# Patient Record
Sex: FEMALE | Race: BLACK OR AFRICAN AMERICAN | Employment: OTHER
[De-identification: names, ages, dates, MRNs, and addresses within clinical notes are randomized per-mention and may not be internally consistent; named-entity substitution may affect disease eponyms.]

---

## 2021-01-01 ENCOUNTER — HOME CARE VISIT (OUTPATIENT)
Dept: SCHEDULING | Facility: HOME HEALTH | Age: 86
End: 2021-01-01
Payer: MEDICARE

## 2021-01-01 ENCOUNTER — HOSPITAL ENCOUNTER (INPATIENT)
Age: 86
LOS: 9 days | Discharge: HOME HEALTH | End: 2021-09-29
Attending: INTERNAL MEDICINE | Admitting: INTERNAL MEDICINE

## 2021-01-01 ENCOUNTER — HOME CARE VISIT (OUTPATIENT)
Dept: HOSPICE | Facility: HOSPICE | Age: 86
End: 2021-01-01
Payer: MEDICARE

## 2021-01-01 ENCOUNTER — HOSPICE ADMISSION (OUTPATIENT)
Dept: HOSPICE | Facility: HOSPICE | Age: 86
End: 2021-01-01
Payer: MEDICARE

## 2021-01-01 ENCOUNTER — HOSPITAL ENCOUNTER (OUTPATIENT)
Dept: LAB | Age: 86
Discharge: HOME | End: 2021-09-29
Payer: MEDICARE

## 2021-01-01 VITALS
SYSTOLIC BLOOD PRESSURE: 150 MMHG | DIASTOLIC BLOOD PRESSURE: 110 MMHG | HEART RATE: 111 BPM | RESPIRATION RATE: 14 BRPM | TEMPERATURE: 98.3 F

## 2021-01-01 VITALS
RESPIRATION RATE: 12 BRPM | TEMPERATURE: 98.5 F | SYSTOLIC BLOOD PRESSURE: 138 MMHG | DIASTOLIC BLOOD PRESSURE: 76 MMHG | HEART RATE: 80 BPM

## 2021-01-01 VITALS
SYSTOLIC BLOOD PRESSURE: 118 MMHG | RESPIRATION RATE: 12 BRPM | TEMPERATURE: 97.2 F | HEART RATE: 66 BPM | DIASTOLIC BLOOD PRESSURE: 60 MMHG

## 2021-01-01 VITALS
TEMPERATURE: 98.3 F | DIASTOLIC BLOOD PRESSURE: 82 MMHG | HEART RATE: 92 BPM | SYSTOLIC BLOOD PRESSURE: 129 MMHG | RESPIRATION RATE: 12 BRPM

## 2021-01-01 VITALS — SYSTOLIC BLOOD PRESSURE: 90 MMHG | DIASTOLIC BLOOD PRESSURE: 70 MMHG | RESPIRATION RATE: 16 BRPM | HEART RATE: 76 BPM

## 2021-01-01 VITALS
HEART RATE: 78 BPM | RESPIRATION RATE: 12 BRPM | DIASTOLIC BLOOD PRESSURE: 50 MMHG | SYSTOLIC BLOOD PRESSURE: 82 MMHG | TEMPERATURE: 98.8 F

## 2021-01-01 VITALS
RESPIRATION RATE: 14 BRPM | TEMPERATURE: 96.2 F | SYSTOLIC BLOOD PRESSURE: 135 MMHG | HEART RATE: 88 BPM | DIASTOLIC BLOOD PRESSURE: 82 MMHG

## 2021-01-01 DIAGNOSIS — M54.50 CHRONIC LOW BACK PAIN WITHOUT SCIATICA, UNSPECIFIED BACK PAIN LATERALITY: ICD-10-CM

## 2021-01-01 DIAGNOSIS — M54.40 CHRONIC LOW BACK PAIN WITH SCIATICA, SCIATICA LATERALITY UNSPECIFIED, UNSPECIFIED BACK PAIN LATERALITY: ICD-10-CM

## 2021-01-01 DIAGNOSIS — E11.65 TYPE 2 DIABETES MELLITUS WITH HYPERGLYCEMIA, WITHOUT LONG-TERM CURRENT USE OF INSULIN (HCC): ICD-10-CM

## 2021-01-01 DIAGNOSIS — L89.152 PRESSURE INJURY OF SACRAL REGION, STAGE 2 (HCC): ICD-10-CM

## 2021-01-01 DIAGNOSIS — M54.50 ACUTE MIDLINE LOW BACK PAIN WITHOUT SCIATICA: ICD-10-CM

## 2021-01-01 DIAGNOSIS — U07.1 COVID-19 VIRUS INFECTION: ICD-10-CM

## 2021-01-01 DIAGNOSIS — U09.9 COVID-19 LONG HAULER: ICD-10-CM

## 2021-01-01 DIAGNOSIS — G93.41 METABOLIC ENCEPHALOPATHY: ICD-10-CM

## 2021-01-01 DIAGNOSIS — D64.9 ANEMIA, UNSPECIFIED TYPE: ICD-10-CM

## 2021-01-01 DIAGNOSIS — R13.12 OROPHARYNGEAL DYSPHAGIA: ICD-10-CM

## 2021-01-01 DIAGNOSIS — G89.29 CHRONIC LOW BACK PAIN WITHOUT SCIATICA, UNSPECIFIED BACK PAIN LATERALITY: ICD-10-CM

## 2021-01-01 DIAGNOSIS — M48.02 CERVICAL STENOSIS OF SPINAL CANAL: ICD-10-CM

## 2021-01-01 DIAGNOSIS — J18.9 PNEUMONIA OF BOTH LUNGS DUE TO INFECTIOUS ORGANISM, UNSPECIFIED PART OF LUNG: ICD-10-CM

## 2021-01-01 DIAGNOSIS — R62.7 FAILURE TO THRIVE IN ADULT: ICD-10-CM

## 2021-01-01 DIAGNOSIS — E87.1 HYPONATREMIA: ICD-10-CM

## 2021-01-01 DIAGNOSIS — E83.42 HYPOMAGNESEMIA: ICD-10-CM

## 2021-01-01 DIAGNOSIS — G89.29 CHRONIC LOW BACK PAIN WITH SCIATICA, SCIATICA LATERALITY UNSPECIFIED, UNSPECIFIED BACK PAIN LATERALITY: ICD-10-CM

## 2021-01-01 DIAGNOSIS — F41.9 ANXIETY: Primary | ICD-10-CM

## 2021-01-01 LAB
COVID-19 RAPID TEST, COVR: DETECTED
SOURCE, COVRS: ABNORMAL

## 2021-01-01 PROCEDURE — 74011250636 HC RX REV CODE- 250/636: Performed by: INTERNAL MEDICINE

## 2021-01-01 PROCEDURE — G0299 HHS/HOSPICE OF RN EA 15 MIN: HCPCS

## 2021-01-01 PROCEDURE — 74011000250 HC RX REV CODE- 250: Performed by: INTERNAL MEDICINE

## 2021-01-01 PROCEDURE — 0651 HSPC ROUTINE HOME CARE

## 2021-01-01 PROCEDURE — 0656 HSPC GENERAL INPATIENT

## 2021-01-01 PROCEDURE — HOSPICE MEDICATION HC HH HOSPICE MEDICATION

## 2021-01-01 PROCEDURE — 74011250637 HC RX REV CODE- 250/637: Performed by: NURSE PRACTITIONER

## 2021-01-01 PROCEDURE — 87635 SARS-COV-2 COVID-19 AMP PRB: CPT

## 2021-01-01 PROCEDURE — 74011250636 HC RX REV CODE- 250/636: Performed by: NURSE PRACTITIONER

## 2021-01-01 PROCEDURE — 74011250637 HC RX REV CODE- 250/637: Performed by: INTERNAL MEDICINE

## 2021-01-01 PROCEDURE — 3336500001 HSPC ELECTION

## 2021-01-01 PROCEDURE — 3331090004 HSPC SERVICE INTENSITY ADD-ON

## 2021-01-01 PROCEDURE — 99221 1ST HOSP IP/OBS SF/LOW 40: CPT | Performed by: INTERNAL MEDICINE

## 2021-01-01 RX ORDER — HALOPERIDOL 2 MG/ML
2 SOLUTION ORAL EVERY 6 HOURS
Status: DISCONTINUED | OUTPATIENT
Start: 2021-01-01 | End: 2021-01-01 | Stop reason: HOSPADM

## 2021-01-01 RX ORDER — SODIUM CHLORIDE 0.9 % (FLUSH) 0.9 %
3 SYRINGE (ML) INJECTION AS NEEDED
Status: DISCONTINUED | OUTPATIENT
Start: 2021-01-01 | End: 2021-01-01

## 2021-01-01 RX ORDER — HALOPERIDOL 5 MG/ML
2 INJECTION INTRAMUSCULAR
Status: DISCONTINUED | OUTPATIENT
Start: 2021-01-01 | End: 2021-01-01 | Stop reason: HOSPADM

## 2021-01-01 RX ORDER — LORAZEPAM 1 MG/1
1 TABLET ORAL
Status: DISCONTINUED | OUTPATIENT
Start: 2021-01-01 | End: 2021-01-01 | Stop reason: HOSPADM

## 2021-01-01 RX ORDER — HALOPERIDOL 5 MG/ML
2 INJECTION INTRAMUSCULAR
Status: DISCONTINUED | OUTPATIENT
Start: 2021-01-01 | End: 2021-01-01

## 2021-01-01 RX ORDER — TRAMADOL HYDROCHLORIDE 50 MG/1
TABLET ORAL
COMMUNITY
Start: 2021-01-01 | End: 2021-01-01

## 2021-01-01 RX ORDER — MORPHINE SULFATE 20 MG/ML
5 SOLUTION ORAL
Qty: 30 ML | Refills: 0 | Status: SHIPPED | OUTPATIENT
Start: 2021-01-01 | End: 2021-01-01

## 2021-01-01 RX ORDER — FACIAL-BODY WIPES
10 EACH TOPICAL DAILY PRN
COMMUNITY
Start: 2021-01-01 | End: 2021-01-01

## 2021-01-01 RX ORDER — SODIUM CHLORIDE 0.9 % (FLUSH) 0.9 %
3 SYRINGE (ML) INJECTION EVERY 12 HOURS
Status: DISCONTINUED | OUTPATIENT
Start: 2021-01-01 | End: 2021-01-01

## 2021-01-01 RX ORDER — HYDRALAZINE HYDROCHLORIDE 50 MG/1
50 TABLET, FILM COATED ORAL 3 TIMES DAILY
COMMUNITY
Start: 2021-01-01 | End: 2021-01-01

## 2021-01-01 RX ORDER — ACETAMINOPHEN 650 MG/1
650 SUPPOSITORY RECTAL
Qty: 2 SUPPOSITORY | Refills: 0 | Status: SHIPPED | OUTPATIENT
Start: 2021-01-01

## 2021-01-01 RX ORDER — DOXYLAMINE SUCCINATE 25 MG
TABLET ORAL AS NEEDED
Qty: 15 G | Refills: 0 | Status: SHIPPED | OUTPATIENT
Start: 2021-01-01

## 2021-01-01 RX ORDER — GLYCOPYRROLATE 0.2 MG/ML
0.2 INJECTION INTRAMUSCULAR; INTRAVENOUS
Status: DISCONTINUED | OUTPATIENT
Start: 2021-01-01 | End: 2021-01-01 | Stop reason: HOSPADM

## 2021-01-01 RX ORDER — DOXYLAMINE SUCCINATE 25 MG
TABLET ORAL
Status: DISCONTINUED | OUTPATIENT
Start: 2021-01-01 | End: 2021-01-01 | Stop reason: HOSPADM

## 2021-01-01 RX ORDER — HALOPERIDOL 2 MG/ML
2 SOLUTION ORAL
Status: DISCONTINUED | OUTPATIENT
Start: 2021-01-01 | End: 2021-01-01 | Stop reason: HOSPADM

## 2021-01-01 RX ORDER — METRONIDAZOLE 500 MG/1
500 TABLET ORAL
Qty: 10 TABLET | Refills: 0 | Status: SHIPPED | OUTPATIENT
Start: 2021-01-01 | End: 2021-01-01

## 2021-01-01 RX ORDER — MORPHINE SULFATE 100 MG/5ML
5 SOLUTION ORAL
Status: DISCONTINUED | OUTPATIENT
Start: 2021-01-01 | End: 2021-01-01 | Stop reason: HOSPADM

## 2021-01-01 RX ORDER — METRONIDAZOLE 500 MG/1
500 TABLET ORAL AS NEEDED
Qty: 5 TABLET | Refills: 0 | Status: SHIPPED | OUTPATIENT
Start: 2021-01-01

## 2021-01-01 RX ORDER — FENTANYL 25 UG/1
1 PATCH TRANSDERMAL
Status: DISCONTINUED | OUTPATIENT
Start: 2021-01-01 | End: 2021-01-01 | Stop reason: HOSPADM

## 2021-01-01 RX ORDER — FENTANYL 12.5 UG/1
1 PATCH TRANSDERMAL
Status: DISCONTINUED | OUTPATIENT
Start: 2021-01-01 | End: 2021-01-01

## 2021-01-01 RX ORDER — MORPHINE SULFATE 2 MG/ML
2 INJECTION, SOLUTION INTRAMUSCULAR; INTRAVENOUS
Status: DISCONTINUED | OUTPATIENT
Start: 2021-01-01 | End: 2021-01-01

## 2021-01-01 RX ORDER — MORPHINE SULFATE 2 MG/ML
2 INJECTION, SOLUTION INTRAMUSCULAR; INTRAVENOUS
Status: DISCONTINUED | OUTPATIENT
Start: 2021-01-01 | End: 2021-01-01 | Stop reason: HOSPADM

## 2021-01-01 RX ORDER — LORAZEPAM 1 MG/1
1 TABLET ORAL
Status: DISCONTINUED | OUTPATIENT
Start: 2021-01-01 | End: 2021-01-01

## 2021-01-01 RX ORDER — LORAZEPAM 1 MG/1
1 TABLET ORAL
Qty: 20 TABLET | Refills: 0 | Status: SHIPPED | OUTPATIENT
Start: 2021-01-01

## 2021-01-01 RX ORDER — METRONIDAZOLE 250 MG/1
500 TABLET ORAL
Status: DISCONTINUED | OUTPATIENT
Start: 2021-01-01 | End: 2021-01-01 | Stop reason: HOSPADM

## 2021-01-01 RX ORDER — HYOSCYAMINE SULFATE 0.12 MG/1
0.12 TABLET SUBLINGUAL
Qty: 10 TABLET | Refills: 0 | Status: SHIPPED | OUTPATIENT
Start: 2021-01-01

## 2021-01-01 RX ORDER — DOXYLAMINE SUCCINATE 25 MG
1 TABLET ORAL
Qty: 15 G | Refills: 0 | Status: SHIPPED | OUTPATIENT
Start: 2021-01-01 | End: 2021-01-01

## 2021-01-01 RX ORDER — BENZONATATE 100 MG/1
100 CAPSULE ORAL
COMMUNITY
Start: 2021-01-01 | End: 2021-01-01

## 2021-01-01 RX ORDER — DOXYLAMINE SUCCINATE 25 MG
TABLET ORAL AS NEEDED
Status: DISCONTINUED | OUTPATIENT
Start: 2021-01-01 | End: 2021-01-01 | Stop reason: HOSPADM

## 2021-01-01 RX ORDER — HALOPERIDOL 5 MG/ML
1 INJECTION INTRAMUSCULAR EVERY 6 HOURS
Status: DISCONTINUED | OUTPATIENT
Start: 2021-01-01 | End: 2021-01-01

## 2021-01-01 RX ORDER — AMLODIPINE BESYLATE 10 MG/1
10 TABLET ORAL DAILY
COMMUNITY
Start: 2021-01-01 | End: 2021-01-01

## 2021-01-01 RX ORDER — CARVEDILOL 25 MG/1
25 TABLET ORAL 2 TIMES DAILY
COMMUNITY
Start: 2021-01-01 | End: 2021-01-01

## 2021-01-01 RX ORDER — FACIAL-BODY WIPES
10 EACH TOPICAL AS NEEDED
Status: DISCONTINUED | OUTPATIENT
Start: 2021-01-01 | End: 2021-01-01 | Stop reason: HOSPADM

## 2021-01-01 RX ORDER — ACETAMINOPHEN 500 MG
1-2 TABLET ORAL
COMMUNITY
End: 2021-01-01

## 2021-01-01 RX ORDER — METRONIDAZOLE 250 MG/1
500 TABLET ORAL AS NEEDED
Status: DISCONTINUED | OUTPATIENT
Start: 2021-01-01 | End: 2021-01-01 | Stop reason: HOSPADM

## 2021-01-01 RX ORDER — FENTANYL 25 UG/1
1 PATCH TRANSDERMAL
Qty: 5 PATCH | Refills: 0 | Status: SHIPPED | OUTPATIENT
Start: 2021-01-01 | End: 2021-01-01

## 2021-01-01 RX ORDER — DEXAMETHASONE 6 MG/1
6 TABLET ORAL DAILY
COMMUNITY
Start: 2021-01-01 | End: 2021-01-01

## 2021-01-01 RX ORDER — FACIAL-BODY WIPES
10 EACH TOPICAL
Qty: 2 SUPPOSITORY | Refills: 0 | Status: SHIPPED | OUTPATIENT
Start: 2021-01-01 | End: 2021-01-01

## 2021-01-01 RX ORDER — ACETAMINOPHEN 650 MG/1
650 SUPPOSITORY RECTAL
Status: DISCONTINUED | OUTPATIENT
Start: 2021-01-01 | End: 2021-01-01 | Stop reason: HOSPADM

## 2021-01-01 RX ORDER — HALOPERIDOL 2 MG/ML
2 SOLUTION ORAL EVERY 6 HOURS
Qty: 30 ML | Refills: 0 | Status: SHIPPED | OUTPATIENT
Start: 2021-01-01 | End: 2021-01-01

## 2021-01-01 RX ORDER — GLYCOPYRROLATE 0.2 MG/ML
0.2 INJECTION INTRAMUSCULAR; INTRAVENOUS
Status: DISCONTINUED | OUTPATIENT
Start: 2021-01-01 | End: 2021-01-01

## 2021-01-01 RX ORDER — FLUTICASONE PROPIONATE 50 MCG
1 SPRAY, SUSPENSION (ML) NASAL DAILY
COMMUNITY
Start: 2021-01-01 | End: 2021-01-01

## 2021-01-01 RX ORDER — GABAPENTIN 300 MG/1
300 CAPSULE ORAL
COMMUNITY
End: 2021-01-01

## 2021-01-01 RX ORDER — HYOSCYAMINE SULFATE 0.12 MG/1
0.12 TABLET SUBLINGUAL
Status: DISCONTINUED | OUTPATIENT
Start: 2021-01-01 | End: 2021-01-01 | Stop reason: HOSPADM

## 2021-01-01 RX ORDER — HALOPERIDOL 2 MG/ML
2 SOLUTION ORAL
Qty: 30 ML | Refills: 0 | Status: SHIPPED | OUTPATIENT
Start: 2021-01-01

## 2021-01-01 RX ORDER — FAMOTIDINE 20 MG/1
1 TABLET, FILM COATED ORAL DAILY
COMMUNITY
Start: 2021-01-01 | End: 2021-01-01

## 2021-01-01 RX ADMIN — MORPHINE SULFATE 5 MG: 10 SOLUTION ORAL at 09:52

## 2021-01-01 RX ADMIN — MORPHINE SULFATE 2 MG: 2 INJECTION, SOLUTION INTRAMUSCULAR; INTRAVENOUS at 20:45

## 2021-01-01 RX ADMIN — HALOPERIDOL LACTATE 2 MG: 5 INJECTION, SOLUTION INTRAMUSCULAR at 05:06

## 2021-01-01 RX ADMIN — MORPHINE SULFATE 2 MG: 2 INJECTION, SOLUTION INTRAMUSCULAR; INTRAVENOUS at 22:07

## 2021-01-01 RX ADMIN — MORPHINE SULFATE 2 MG: 2 INJECTION, SOLUTION INTRAMUSCULAR; INTRAVENOUS at 13:07

## 2021-01-01 RX ADMIN — HALOPERIDOL LACTATE 2 MG: 5 INJECTION, SOLUTION INTRAMUSCULAR at 13:08

## 2021-01-01 RX ADMIN — MORPHINE SULFATE 2 MG: 2 INJECTION, SOLUTION INTRAMUSCULAR; INTRAVENOUS at 08:23

## 2021-01-01 RX ADMIN — HALOPERIDOL LACTATE 1 MG: 5 INJECTION, SOLUTION INTRAMUSCULAR at 12:52

## 2021-01-01 RX ADMIN — LORAZEPAM 1 MG: 1 TABLET ORAL at 07:23

## 2021-01-01 RX ADMIN — LORAZEPAM 1 MG: 1 TABLET ORAL at 09:10

## 2021-01-01 RX ADMIN — HALOPERIDOL LACTATE 1 MG: 5 INJECTION, SOLUTION INTRAMUSCULAR at 05:55

## 2021-01-01 RX ADMIN — Medication 3 ML: at 21:00

## 2021-01-01 RX ADMIN — HALOPERIDOL LACTATE 2 MG: 5 INJECTION, SOLUTION INTRAMUSCULAR at 13:03

## 2021-01-01 RX ADMIN — MORPHINE SULFATE 2 MG: 2 INJECTION, SOLUTION INTRAMUSCULAR; INTRAVENOUS at 15:05

## 2021-01-01 RX ADMIN — Medication 3 ML: at 01:25

## 2021-01-01 RX ADMIN — MORPHINE SULFATE 2 MG: 2 INJECTION, SOLUTION INTRAMUSCULAR; INTRAVENOUS at 09:33

## 2021-01-01 RX ADMIN — MORPHINE SULFATE 2 MG: 2 INJECTION, SOLUTION INTRAMUSCULAR; INTRAVENOUS at 10:47

## 2021-01-01 RX ADMIN — MORPHINE SULFATE 2 MG: 2 INJECTION, SOLUTION INTRAMUSCULAR; INTRAVENOUS at 13:03

## 2021-01-01 RX ADMIN — MORPHINE SULFATE 2 MG: 2 INJECTION, SOLUTION INTRAMUSCULAR; INTRAVENOUS at 16:25

## 2021-01-01 RX ADMIN — SODIUM CHLORIDE, PRESERVATIVE FREE 3 ML: 5 INJECTION INTRAVENOUS at 13:04

## 2021-01-01 RX ADMIN — HALOPERIDOL LACTATE 1 MG: 5 INJECTION, SOLUTION INTRAMUSCULAR at 17:27

## 2021-01-01 RX ADMIN — LORAZEPAM 1 MG: 1 TABLET ORAL at 09:32

## 2021-01-01 RX ADMIN — HALOPERIDOL LACTATE 1 MG: 5 INJECTION, SOLUTION INTRAMUSCULAR at 05:42

## 2021-01-01 RX ADMIN — MORPHINE SULFATE 2 MG: 2 INJECTION, SOLUTION INTRAMUSCULAR; INTRAVENOUS at 17:27

## 2021-01-01 RX ADMIN — METRONIDAZOLE 500 MG: 250 TABLET ORAL at 10:00

## 2021-01-01 RX ADMIN — HALOPERIDOL 2 MG: 2 SOLUTION ORAL at 12:44

## 2021-01-01 RX ADMIN — HALOPERIDOL LACTATE 1 MG: 5 INJECTION, SOLUTION INTRAMUSCULAR at 00:26

## 2021-01-01 RX ADMIN — MORPHINE SULFATE 2 MG: 2 INJECTION, SOLUTION INTRAMUSCULAR; INTRAVENOUS at 12:36

## 2021-01-01 RX ADMIN — HALOPERIDOL LACTATE 2 MG: 5 INJECTION, SOLUTION INTRAMUSCULAR at 16:25

## 2021-01-01 RX ADMIN — MORPHINE SULFATE 5 MG: 10 SOLUTION ORAL at 15:14

## 2021-01-01 RX ADMIN — MORPHINE SULFATE 2 MG: 2 INJECTION, SOLUTION INTRAMUSCULAR; INTRAVENOUS at 15:53

## 2021-01-01 RX ADMIN — HALOPERIDOL LACTATE 1 MG: 5 INJECTION, SOLUTION INTRAMUSCULAR at 18:17

## 2021-01-01 RX ADMIN — Medication: at 10:01

## 2021-01-01 RX ADMIN — HALOPERIDOL LACTATE 1 MG: 5 INJECTION, SOLUTION INTRAMUSCULAR at 00:22

## 2021-01-01 RX ADMIN — Medication 3 ML: at 10:47

## 2021-01-01 RX ADMIN — MORPHINE SULFATE 2 MG: 2 INJECTION, SOLUTION INTRAMUSCULAR; INTRAVENOUS at 22:34

## 2021-01-01 RX ADMIN — MORPHINE SULFATE 2 MG: 2 INJECTION, SOLUTION INTRAMUSCULAR; INTRAVENOUS at 05:05

## 2021-01-01 RX ADMIN — HALOPERIDOL LACTATE 1 MG: 5 INJECTION, SOLUTION INTRAMUSCULAR at 17:30

## 2021-01-01 RX ADMIN — MORPHINE SULFATE 2 MG: 2 INJECTION, SOLUTION INTRAMUSCULAR; INTRAVENOUS at 07:22

## 2021-01-01 RX ADMIN — HALOPERIDOL 2 MG: 2 SOLUTION ORAL at 18:30

## 2021-01-01 RX ADMIN — MORPHINE SULFATE 2 MG: 2 INJECTION, SOLUTION INTRAMUSCULAR; INTRAVENOUS at 03:07

## 2021-01-01 RX ADMIN — HALOPERIDOL 2 MG: 2 SOLUTION ORAL at 23:28

## 2021-01-01 RX ADMIN — HALOPERIDOL 2 MG: 2 SOLUTION ORAL at 05:25

## 2021-01-01 RX ADMIN — HALOPERIDOL LACTATE 2 MG: 5 INJECTION, SOLUTION INTRAMUSCULAR at 05:51

## 2021-01-01 RX ADMIN — MORPHINE SULFATE 2 MG: 2 INJECTION, SOLUTION INTRAMUSCULAR; INTRAVENOUS at 10:35

## 2021-01-01 RX ADMIN — SODIUM CHLORIDE, PRESERVATIVE FREE 3 ML: 5 INJECTION INTRAVENOUS at 22:34

## 2021-01-01 RX ADMIN — MORPHINE SULFATE 2 MG: 2 INJECTION, SOLUTION INTRAMUSCULAR; INTRAVENOUS at 05:50

## 2021-01-01 RX ADMIN — HALOPERIDOL LACTATE 1 MG: 5 INJECTION, SOLUTION INTRAMUSCULAR at 13:05

## 2021-01-01 RX ADMIN — MORPHINE SULFATE 2 MG: 2 INJECTION, SOLUTION INTRAMUSCULAR; INTRAVENOUS at 05:43

## 2021-01-01 RX ADMIN — HALOPERIDOL LACTATE 1 MG: 5 INJECTION, SOLUTION INTRAMUSCULAR at 05:43

## 2021-01-01 RX ADMIN — HALOPERIDOL LACTATE 2 MG: 5 INJECTION, SOLUTION INTRAMUSCULAR at 22:05

## 2021-01-01 RX ADMIN — HALOPERIDOL 2 MG: 2 SOLUTION ORAL at 13:15

## 2021-01-01 RX ADMIN — HALOPERIDOL LACTATE 2 MG: 5 INJECTION, SOLUTION INTRAMUSCULAR at 15:06

## 2021-01-01 RX ADMIN — HALOPERIDOL LACTATE 1 MG: 5 INJECTION, SOLUTION INTRAMUSCULAR at 00:01

## 2021-01-01 RX ADMIN — HALOPERIDOL 2 MG: 2 SOLUTION ORAL at 10:32

## 2021-01-01 RX ADMIN — HALOPERIDOL LACTATE 2 MG: 5 INJECTION, SOLUTION INTRAMUSCULAR at 08:24

## 2021-09-20 PROBLEM — U09.9 COVID-19 LONG HAULER: Status: ACTIVE | Noted: 2021-01-01

## 2021-09-20 PROBLEM — L89.152 PRESSURE INJURY OF SACRAL REGION, STAGE 2 (HCC): Status: ACTIVE | Noted: 2021-01-01

## 2021-09-20 PROBLEM — U07.1 COVID-19 VIRUS INFECTION: Status: ACTIVE | Noted: 2021-01-01

## 2021-09-20 PROBLEM — E11.65 TYPE 2 DIABETES MELLITUS WITH HYPERGLYCEMIA, WITHOUT LONG-TERM CURRENT USE OF INSULIN (HCC): Status: ACTIVE | Noted: 2017-08-18

## 2021-09-20 PROBLEM — R13.12 OROPHARYNGEAL DYSPHAGIA: Status: ACTIVE | Noted: 2017-08-18

## 2021-09-20 PROBLEM — D64.9 ANEMIA: Status: ACTIVE | Noted: 2017-08-18

## 2021-09-20 PROBLEM — N30.01 ACUTE CYSTITIS WITH HEMATURIA: Status: RESOLVED | Noted: 2021-01-01 | Resolved: 2021-01-01

## 2021-09-20 PROBLEM — J18.9 PNEUMONIA OF BOTH LUNGS DUE TO INFECTIOUS ORGANISM: Status: ACTIVE | Noted: 2021-01-01

## 2021-09-20 PROBLEM — R53.81 DEBILITY: Status: ACTIVE | Noted: 2019-03-28

## 2021-09-20 PROBLEM — M48.02 CERVICAL STENOSIS OF SPINAL CANAL: Status: ACTIVE | Noted: 2017-08-18

## 2021-09-20 PROBLEM — E87.1 HYPONATREMIA: Status: ACTIVE | Noted: 2021-01-01

## 2021-09-20 PROBLEM — L89.153 PRESSURE INJURY OF SACRAL REGION, STAGE 3 (HCC): Status: ACTIVE | Noted: 2021-01-01

## 2021-09-20 PROBLEM — R62.7 FAILURE TO THRIVE IN ADULT: Status: ACTIVE | Noted: 2021-01-01

## 2021-09-20 PROBLEM — N30.01 ACUTE CYSTITIS WITH HEMATURIA: Status: ACTIVE | Noted: 2021-01-01

## 2021-09-20 PROBLEM — E83.42 HYPOMAGNESEMIA: Status: ACTIVE | Noted: 2021-01-01

## 2021-09-20 PROBLEM — I10 ESSENTIAL HYPERTENSION: Status: RESOLVED | Noted: 2017-08-18 | Resolved: 2021-01-01

## 2021-09-20 PROBLEM — M54.50 ACUTE MIDLINE LOW BACK PAIN WITHOUT SCIATICA: Status: ACTIVE | Noted: 2021-01-01

## 2021-09-20 PROBLEM — I10 ESSENTIAL HYPERTENSION: Status: ACTIVE | Noted: 2017-08-18

## 2021-09-20 PROBLEM — G93.41 METABOLIC ENCEPHALOPATHY: Status: ACTIVE | Noted: 2021-01-01

## 2021-09-20 NOTE — PROGRESS NOTES
Problem: Risk for Spread of Infection  Goal: Prevent transmission of infectious organism to others  Description: Prevent the transmission of infectious organisms to other patients, staff members, and visitors. Outcome: Progressing Towards Goal  Note: Pt is Covid + 9/19;  PPE provided for staff and family     Problem: Falls - Risk of  Goal: *Absence of Falls  Description: Document [place] Fall Risk and appropriate interventions in the flowsheet. Outcome: Progressing Towards Goal  Note: Fall Risk Interventions:       Mentation Interventions: Adequate sleep, hydration, pain control, Bed/chair exit alarm (door closed , Droplet Precaution)    Medication Interventions: Bed/chair exit alarm    Elimination Interventions: Bed/chair exit alarm, Call light in reach, Toileting schedule/hourly rounds              Problem: Pressure Injury - Risk of  Goal: *Prevention of pressure injury  Description: Document Moustapha Scale and appropriate interventions in the flowsheet.   Outcome: Progressing Towards Goal  Note: Pressure Injury Interventions:  Sensory Interventions: Assess changes in LOC, Avoid rigorous massage over bony prominences, Float heels, Keep linens dry and wrinkle-free    Moisture Interventions: Absorbent underpads, Apply protective barrier, creams and emollients, Internal/External urinary devices    Activity Interventions: Pressure redistribution bed/mattress(bed type)    Mobility Interventions: Float heels, HOB 30 degrees or less, Pressure redistribution bed/mattress (bed type)    Nutrition Interventions: Document food/fluid/supplement intake, Offer support with meals,snacks and hydration    Friction and Shear Interventions: Apply protective barrier, creams and emollients, Feet elevated on foot rest, Foam dressings/transparent film/skin sealants, HOB 30 degrees or less, Minimize layers                Problem: Pressure Injury - Risk of  Goal: *Prevention of pressure injury  Description: Document Moustapha Scale and appropriate interventions in the flowsheet. Outcome: Progressing Towards Goal  Note: Pressure Injury Interventions:  Sensory Interventions: Assess changes in LOC, Avoid rigorous massage over bony prominences, Float heels, Keep linens dry and wrinkle-free    Moisture Interventions: Absorbent underpads, Apply protective barrier, creams and emollients, Internal/External urinary devices    Activity Interventions: Pressure redistribution bed/mattress(bed type)    Mobility Interventions: Float heels, HOB 30 degrees or less, Pressure redistribution bed/mattress (bed type)    Nutrition Interventions: Document food/fluid/supplement intake, Offer support with meals,snacks and hydration    Friction and Shear Interventions: Apply protective barrier, creams and emollients, Feet elevated on foot rest, Foam dressings/transparent film/skin sealants, HOB 30 degrees or less, Minimize layers                Problem: Patient Education: Go to Patient Education Activity  Goal: Patient/Family Education  Outcome: Progressing Towards Goal  Note: Pt nonverbal, end of Life in <6 months per Dr note     Problem: Dyspnea Due to End of Life  Goal: Demonstrate understanding of and ability to manage respiratory symptoms at end of life  Outcome: Progressing Towards Goal  Note: RA O2 sat 97%,.  No SOB noted on arrival. PRN O2 for comfort per Dr order     Problem: Communication Deficit  Goal: Effectively communicate symptoms, needs, and concerns  Outcome: Progressing Towards Goal  Note: Pt is nonverbal     Problem: Imminent Death  Goal: Collaborate with patient/family/caregiver/interdisciplinary team to minimize and manage end of life symptoms  Outcome: Progressing Towards Goal  Note: No family or care giver present when pt arrived [place] [facility]

## 2021-09-20 NOTE — H&P
[place]: Mrs. Nelson Whitt is a 51-year-old woman who has shown a deepening metabolic encephalopathy over the past 6 weeks. Freida Fleischer has now reached a point of near obtundation and can no longer swallow food fluid or medications. Freida Fleischer is intermittently agitated. Freida Fleischer has acute on chronic pain from cervical spinal stenosis, radicular neuropathic pain, and stage III sacral decubitus ulcers which is undoubtedly contributing to her delirium/metabolic encephalopathy. .    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 Past medical history: Hypertensive cardiomyopathy are diagnoses unrelated to her metabolic encephalopathy/delirium that nevertheless adversely affect prognosis to some degree,    Social history: Non-smoker  lives with daughter who is primary care provider. Family is consistent and expressing their inability to continue to provide care at home. Family history: Noncontributory    Review of systems: Unobtainable from profoundly obtunded patient. Physical exam: See nurses notes for vital signs on admission demonstrating marked hypotension (66/38) bradycardia dysrhythmia (52 bpm). There is no evidence of respiratory distress with respiratory rate of 15 breaths/min. She is an -American woman not showing any arina livedo reticularis. There are coarse interstitial breath sounds throughout anterior and posterior lung fields bilaterally. Abdomen is scaphoid. Bowel sounds are reduced. Extremities show sarcopenia.   The patient's sacral wound is fairly shallow due to her lack of adipose there is a heavy purulent slough. Impression and plan: The patient's cognitive and physical slide with persistence Covid-19 infection starting August 5, 2021 has caused her caregiving daughter, Joyce Perera, to elect  on her mother's behalf to stop life extending interventions including artificial hydration and nutrition.   Lisandra Conrad the circumstances this woman's life expectancy is less than 1 week. During that period of terminal decline she will continue to require parenteral medication for pain, dyspnea and emotional/psychiatric distress. Pressure relief interventions and hygienic infrequent change of sacral dressing are appropriate. We will avoid scheduled opioid analgesia in light of the patient's extreme hypotension. We will provide cautious doses of Morphine 2 mg intravenously every 30 minutes when needed for evident distress. Likewise Haldol intravenous will be provided 2 mg hourly as necessary for acute delirium.

## 2021-09-20 NOTE — [facility] IDG NURSE NOTES
Patient: Sadaf Moreira    Date: 09/20/21  Time: 6:39 PM    [facility] Nurse Notes  70-year-old female arrived to [facility] via [facility] with Dx; Metabolic Encephalopathy. Pt is COVID (+)  from test done 8/4 and 9/19. PPE and Droplet Precautions in place. Pt placed on Air Mattress due to immobility and sacral wound. VSS; Temp 98.6   Patient's LOC is GIP, with Symptom management for pain. 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 Pt unable to move on her own and is Total Care. Pt has stage 3 sacral wound, foul smelling with brownish - green drainage and unattached edges with visible bone, measuring 7cm x5cm x 3 cm. Linn placed shortly after arrival, thick yellow urine returned with sediment, pus and strong foul odor . Pt has  #20 peripheral functioning IV in right hand. Medical History includes, Cervical Spine Stenosis, HTN, CHF, Neuropathic Pain, Dm and Glaucoma. Granddaughter Ondina Tucker" is at the bedside, stating \" was up and about, sharp mentally before she got Covid in August then went downhill\". Granddaughter also stating pt lives with her daughter , who is her primary care giver. She didn't have that sore (stage 3 ) until she went into the hospital in august\"    Admission complete and initial General Hospice care plans initiated in addition to symptom-management and LDA. IDG team made aware of plan of care and immediate needs. No active signs or symptoms of pain, shortness of breath , anxiety , seizures or nausea/vomiting upon arrival and presently. Comfort and safety measures in place.            Signed by: Maine Ruiz

## 2021-09-20 NOTE — PROGRESS NOTES
1500: Pt arrived via [facility] with Dx; Metabolic Encephalopathy. Pts LOC is GIP, sx's management for pain. 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 Dr Alden Morse in to see pt. Orders received for morton placement and scaral wound dressing care to be done Q72 bours. Pt has functioning IV, #20 in right hand. Pt name and  identified. Pt in bed, resting with eyes closed. No signs or symptoms of pain, shortness of breath, anxiety , seizures or nausea/vomiting. FLACC 0/10. Comfort and safety measures in place. HOB elevated 30 degrees. Side rails up x2. Bed low and locked. Bed alarm tab in place. Call light in reach of patient, Door closed, pt on Droplet Precautions for Covid. Reported pt had 2nd Positive test . [facility] and [facility] done with CNA. Pt expected to pass under GIP however will continue to assess change in LOC, medication & comfort needs, while collaborating with the Interdisciplinary Team.    : Morton catheter inserted without difficulty. Thick cloudy sediment yellow urine returned. Pt tolerated well.     1600: DRESSING CHANGE:  Pt has stage 3 to sacrum. Measures 7x5x3 with  Attached edges. Strong foul odor coming from wound when pre-existing dressing removed and area measured. Minimal drainage noted with blackened-greenish  areas in fleshy wound bed. Dr Alden Morse made aware of wound. Verbal order received. Wound cleaned with wound cleanser, packed with Calcium Algenate , covered with ABD pad then Opsite. Dressing tined and dated. Pt tolerated well. No wincing or grimacing of pain. 1830:  Pt's granddaughter \"Sarah \" here to see pt, PPE in place. Attempting to feed pt applesauce. Patient quietly in bed with eyes half-open. Respirations unlabored with no signs or symptoms of distress, pain, agitation, or discomfort noted. FLACC 0.     Report given to Alycia Baig RN

## 2021-09-21 NOTE — PROGRESS NOTES
Bedside report received from off-going RN visual identification made, assumed care of pt. Pt resting quietly with eyes closed, no agitation or restlessness, no grimacing or groaning. Pt respirations unlabored. Tab alert in place, rails up x 2, bed in lowest position, safety maintained. FLACC 0. Pt is at GIP level of care, no change to pt discharge plan. Pt to stay at [facility] until passing. Plan of care reviewed with CNA. 2114-Pt resting comfortably in bed with eyes closed; no signs of pain or distress noted. RR non labored. No agitation, NVD, or SOB. FLACC 0/10.    2248-Pt resting comfortably in bed with eyes closed; no signs of pain or distress noted. RR non labored. No agitation, NVD, or SOB. FLACC 0/10.    0125- Pt resting comfortably in bed with eyes closed; no signs of pain or distress noted. RR non labored. No agitation, NVD, or SOB. FLACC 0/10.    0521-Pt resting comfortably in bed with eyes closed; no signs of pain or distress noted. RR non labored. No agitation, NVD, or SOB. FLACC 0/10.

## 2021-09-21 NOTE — PROGRESS NOTES
SW attempted to call pt's daughter Ashwini Daniels to complete assessment. No one answered the phone and the mailbox was full. SW will continue to try to reach [facility] to complete assessment, assess for needs, and provide emotional support.

## 2021-09-21 NOTE — [facility] IDG SOCIAL WORKER NOTES
Patient: Barbie Azevedo    Date: 09/21/21  Time: 9:45 AM    [facility]  Notes  SW has read the initial comprehensive assessment and plan of care. No immediate needs noted. Initial SW assessment visit will be completed within 5 days of admission.          Signed by: Stephanie Yi LMSW

## 2021-09-21 NOTE — PROGRESS NOTES
0645: Pt admitted 9/20/2021 for [facility] level of care with a hospice diagnosis of metabolic encephalopathy as well as testing positive for COVID 19 on 8/5 and 9/19. Pt is on droplet plus precautions. She is being managed for pain and complex wound care. Pt did not require any PRN medications for symptom managementt. She is not on any scheduled medications. Presently pt is resting with eyes closed, resps are non-labored. No s/sx of pain or other discomfort observed. FLACC score 0/10.      0845: Daughter arrived and assisted to apply PPE. Pt resting with eyes closed, resps remain non-labored. FLACC score 0/10.     1045: No family present ion the room at this time. FLACC score 0/10. However dressing to sacral area is saturated and needs to be changed. Pt premedicated with Morphine 2mg IVP to promote comfort during dressing change and repositioning. Dressing changed and pt positioned on left side for comfort. Pt moaning during care, able to state she was hurting so pillows added for more comfort and pt stated some relief. Remains on an air mattress for comfort. 1115: More family arrived to visit. Assisted with PPE. Pt resting with eyes closed, resps regular and even. No s/sx of pain or other discomfort observed. FLACC score now 0/10. Family asking if they could remove shields once in the room. Advised by RN against this. 1320: No family present in the room. Pt remains in bed with eyes closed, resps regular and non-labored. FLACC score 0/10.     1520: Pt continues to rest with eyes closed, resps regular and non-labored. FLACC score 0/10.     1730: Pt resting with eyes closed, reps shallow but non-labored. FLACC score 0/10. Drsg to sacrum checked and remains dry and intact. Pt opens eyes to blankets being moved but s/sx of pain or other discomfort observed. 1850: Report given to oncoming RN.

## 2021-09-22 NOTE — PROGRESS NOTES
Demographics      Information provided by: Pt's daughter, Argenis Dumont.         Name: [facility]  GIP [x] Routine  [] Respite   []           From the in home program Yes [] No [x]                                                        Diagnosis: Metabolic Encephalopathy         Insurance    Medicare [x]   Medicaid  []  Ilir Hendominic  []      Other []                Social    []   Single []     []    [x]     Children: Daughter, Argenis Dumont and son, Kiera Causey in the Home prior to admission: Yes []  No [x]    [employer] Needed? Yes []  No [x]    If yes, explain:         Financial Concerns: There are no reported financial concerns at this time. SW will continue to assess for this need. [place] Application Needed   Yes []  No [x]     Medicaid application needed Yes []  No [x]                                    IFA Form Complete  Yes [x]   No []     Discharge Plans: Plans are to remain [facility] for GIP and comfort care. Work History :  Retired [x] [employer] [] Part-time [] Disabled []     [address]   Yes []  No [x]  [employer] []   [employer] [] [employer] [] [facility] []  [employer] []  [employer] []  Linked to [place]   Yes []  No []  Referral made to [employer] Yes [] No [x]         Advanced Directives Scanned in the system      Living Will  Yes  []  No [x]   HCPOA     Yes  []  No [x]   DPOA        Yes  []  No [x]  DNR           Yes [x]  No []         Spiritual / Sonu Juarez Support: Ubaldo VANN reports that there is \"not really\" a [place]ze supporting the family. Final Arrangements: Ubaldo VANN reports that she has not thought about a  home. AUBREE encouraged Ubaldo VANN to think about this and if  assistance is needed, SW will help with this process.  Ubaldo VANN thanked  for the support and reported that she will call  if there is a need. SW will follow up on this before the end of the week. Medical History and/or Narrative copied from the patients chart from the [address] [place] [address] Physician or Rn:  [facility] Nurse Notes-  80-year-old female arrived [facility] [place] [address] with Dx; Metabolic Encephalopathy. Pt is COVID (+)  from test done 8/4 and 9/19. PPE and Droplet Precautions in place. Pt placed on Air Mattress due to immobility and sacral wound. VSS; Temp 98.6   Patient's LOC is GIP, with Symptom management for pain. 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 Pt unable to move on her own and is Total Care. Pt has stage 3 sacral wound, foul smelling with brownish - green drainage and unattached edges with visible bone, measuring 7cm x5cm x 3 cm. Linn placed shortly after arrival, thick yellow urine returned with sediment, pus and strong foul odor . Pt has  #20 peripheral functioning IV in right hand. Medical History includes, Cervical Spine Stenosis, HTN, CHF, Neuropathic Pain, Dm and Glaucoma. Granddaughter Sarita Rodarte" is at the bedside, stating \" was up and about, sharp mentally before she got Covid in August then went downhill\". Granddaughter also stating pt lives with her daughter , who is her primary care giver. She didn't have that sore (stage 3 ) until she went into the hospital in august\"  Admission complete and initial General Hospice care plans initiated in addition to symptom-management and LDA. IDG team made aware of plan of care and immediate needs. No active signs or symptoms of pain, shortness of breath , anxiety , seizures or nausea/vomiting upon arrival and presently. Comfort and safety measures in place. Mental Health History: There is no reported mental health history. Volunteer discussion: Yes []    No [x]  **Due to COVID-19 protocols     Goals of care for the patient and family: To keep pt comfortable and to meet pt and family needs.      Coping and Bereavement: Mariama Deluca reports that she is \"doing okay\". This assessment was done over the phone and it did not sound as though Nohemi VANN was crying. SW will continue to assess for coping and bereavement needs. Was a Referral made to Bereavement  Yes [] No  [x]    Support Needs: José Miguel VANN reports that pt and the family are supportive of one another and there is plenty of support. SW explained SWs role and ways SW can be of support to the family. Radha Daly was thankful for the call. SW will continue to provide emotional support, weekly check-ins, and assess for needs.

## 2021-09-22 NOTE — PROGRESS NOTES
SW met with pt's son Adriel Bal and her grandson Lindsay Delcid as they were leaving pt's room after having visited with her. Lindsay Delcid asked SW about prognosis for pt and status. Sam Porras and Shaisam Rayshawnjohn know that they must have the security code and they reported that they will ask their family for the code. SW will ask [address] nurse to call Lindsay Delcid to give him an update on pt status. Lindsay Delcid reported that he can be notified any time of the day and night with an update on pt status. Adriel Bal and Lindsay Delcid reported that Lindsay Delcid will provide any updates to Adriel Bal. SW encouraged Lindsay Delcid and Adriel Bal to reach out to SW with any needs.

## 2021-09-22 NOTE — PROGRESS NOTES
Progress Note    Patient: Fabiola Caruso MRN: [id number]  SSN: [id number]    YOB: 1925  Age: 80 y.o. Sex: female      Admit Date: 9/20/2021    LOS: 2 days     Subjective:     Taking in bites and sips. Has required morphine x 4 doses for pain in the past 24 hours. Review of Systems:  Review of systems not obtained due to patient factors. Objective:     Vitals:    09/20/21 1510 09/21/21 0612 09/21/21 1100 09/22/21 0636   BP: (!) 66/38 130/75 121/72 121/64   Pulse: (!) 52 (!) 101 92    Resp: 15 18 17    Temp: 98.7 °F (37.1 °C)   (!) 96.2 °F (35.7 °C)        Intake and Output:  Current Shift: No intake/output data recorded. Last three shifts: 09/20 1901 - 09/22 0700  In: -   Out: 400 [Urine:400]    Physical Exam: (Exam completed with input from primary RN due to Covid-19)  GENERAL: fatigued, confused. LUNG: Coarse diminished breath sounds with unlabored respirations. HEART: regular rate and rhythm  ABDOMEN: soft, non-tender. Bowel sounds normal.   : Linn catheter with cloudy yellow urine with sediment. EXTREMITIES:  Extremities with no cyanosis. Mild lower ext. Edema. SKIN: Warm to touch. Stage 3 wound to sacrum with dressing in place. NEUROLOGIC: Lethargic, confused. Generalized weakness. Bedbound. PSYCHIATRIC: agitated    Lab/Data Review:  No new labs resulted in the last 24 hours.     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)      Acute midline low back pain without sciatica (9/20/2021)      Pressure injury of sacral region, stage 2 ([facility] [address] (2/88/6563)      Metabolic encephalopathy (8/16/6920)        Plan:     Current Facility-Administered Medications   Medication Dose Route Frequency    sodium chloride (NS) flush 3 mL  3 mL IntraVENous Q12H    sodium chloride (NS) flush 3 mL  3 mL IntraVENous PRN    morphine injection 2 mg  2 mg SubCUTAneous Q20MIN PRN    Or    morphine injection 2 mg  2 mg IntraVENous Q20MIN PRN    haloperidol lactate (HALDOL) injection 2 mg  2 mg SubCUTAneous Q1H PRN    Or    haloperidol lactate (HALDOL) injection 2 mg  2 mg IntraVENous Q1H PRN    acetaminophen (TYLENOL) suppository 650 mg  650 mg Rectal Q3H PRN    LORazepam (ATIVAN) tablet 1 mg  1 mg Oral Q4H PRN    bisacodyL (DULCOLAX) suppository 10 mg  10 mg Rectal PRN    haloperidol lactate (HALDOL) injection 2 mg  2 mg SubCUTAneous Q1H PRN    Or    haloperidol lactate (HALDOL) injection 2 mg  2 mg IntraVENous Q1H PRN    glycopyrrolate (ROBINUL) injection 0.2 mg  0.2 mg SubCUTAneous Q4H PRN       Remains appropriate for GIP level of care. Add Fentanyly 12mcg/hr patch for pain and continue prn doses of morphine prior to body care and repositioning.      Signed By: Pedrito Delarosa NP     September 22, 2021

## 2021-09-22 NOTE — PROGRESS NOTES
0645: Pt admitted 9/20/2021 for [facility] level of care with a hospice diagnosis of metabolic encephalopathy as well as testing positive for COVID 19 on 8/5 and 9/19. Pt is on droplet plus precautions. She is being managed for pain and complex wound care. Pt  required one dose of Morphine 2mg IVP for comfort during the night. She is not on any scheduled medications. Presently pt is resting with eyes closed, resps are non-labored. No s/sx of pain or other discomfort observed. FLACC score 0/10.      0845: Pt resting with eyes closed, resps shallow and non-labored. FLACC score 0/10.     1045: Family arrived to room and assisted with PPE. Encouraged to ring call bell once in room for any needs. Pt with FLACC score of 0/10. No needs or discomfort observed at this time. 1245: Oumar Cadet arrived to the room to visit. Assisted with PPE. Pt talking out loud in room. Granddaughter to call for any needs. 1303: Called to room by granddaughter. Update given after code provided. Repositioned pt in bed upon request of pt. Pt states she is hurting but unable to identify area or intensity. Does have facial grimacing and stiffens up when being moved. Haldol 2mg sc and Morphine 2mg sc administered to promote comfort. FLACC score 5/10. Pt also stated she is hungry. Mashed potatoes and gravy ordered to the room. 1330: More family arrived at this time and assisted into PPE. Pt now resting with eyes closed, resps shallow but non-labored. FLACC score 0/10    1552: Offered bites of mashed potatoes and gravy. Pt took two small bites then said that is enough. Small sip of water given and mouth care given. Fentanyl 12mcg/hr patch applied to left upper chest, Morphine 2mg sc administered prior to dressing change. Drsg to sacrum changed for moderate amount of foul smelling purulent drainage. Pt dos not tolerate dressing change well. Moaning, cries at times. Yelling out and agitated even after dressing changed and repositioned.      1625: Haldol 2mg SC and Morphine 2mg SC administered to decrease agitation and pain. FLACC score 8/10.     1655: Pt now resting with eyes closed, resps shallow, non-labored. FLACC score now 0/10.     1720: Family arrived and assisted with PPE.

## 2021-09-22 NOTE — PROGRESS NOTES
Background: Aidan Irizarry is a 80year old  who who arrived via EMS from [facility]. She was admitted to [facility] on September 20, 2021 with a diagnosis of Metabolic Encephalopathy. She is on isolation precautions due to COVID 19. She is visually impaired. She is supported by her daughter Radha Solis ( [phone]). In addition, her granddaughter Madhu Palmer was at bedside upon admission. Plan: Explore patient/ family rj tradition and how their rj informs their feelings about the hospice philosophy.

## 2021-09-22 NOTE — PROGRESS NOTES
1930 Received report from off going RN  Identified pt by name and date of birth. [facility] level of care admitted for hospice dx of metabolic encephalopathy and Covid 19 to manage pain and complex wound care    Pt plans to remain at [facility] until passing. LOC will be reviewed often for signs of change in LOC   Plan of Care was reviewed. Safety measures such as tab alert,  bed in low/locked position ,and  side rails x 3, are in place. Linn draining yellow urine. Family at bedside    2045 Pts family member requested pain medication for pt. Stated that her wound was hurting her. PRN Morphine 2 mg SC given for pain. Lights dimmed and pt made comfortable     2110  Pt resting quietly with eyes closed. No signs of distress noted. FLACC 0/10       2320 Pt resting with eyes closed. No signs of distress noted. FLACC 0/10     0127  Pt resting with eyes closed. No signs of distress noted. All safety measures in place  FLACC 0/10     0307  Pt moaning softly. She stated her wound was hurting her. PRN morphine 2 mg SC given     FLACC  5/10     0340 Pt resting quietly with eyes closed. No signs of distress noted. FLACC 0/10 0545 Pt resting with eyes closed. No signs of distress noted.   FLACC 0/10

## 2021-09-22 NOTE — PROGRESS NOTES
Problem: Emotional Support Needs  Goal: Patient/family is receiving emotional support  Description: Pt, Nohemi J, and family will receive emotional support weekly from SW through weekly check-ins, education on the hospice philosophy, rapport building, active listening, and validation of feelings throughout pt's hospice journey.    Outcome: Progressing Towards Goal

## 2021-09-22 NOTE — PROGRESS NOTES
Problem: Risk for Spread of Infection  Goal: Prevent transmission of infectious organism to others  Description: Prevent the transmission of infectious organisms to other patients, staff members, and visitors. Outcome: Progressing Towards Goal     Problem: Falls - Risk of  Goal: *Absence of Falls  Description: Document Miladis Wallaceen Fall Risk and appropriate interventions in the flowsheet. Outcome: Progressing Towards Goal  Note: Fall Risk Interventions:       Mentation Interventions: Adequate sleep, hydration, pain control, Bed/chair exit alarm (door closed , Droplet Precaution)    Medication Interventions: Bed/chair exit alarm    Elimination Interventions: Bed/chair exit alarm, Call light in reach, Toileting schedule/hourly rounds              Problem: Pressure Injury - Risk of  Goal: *Prevention of pressure injury  Description: Document Moustapha Scale and appropriate interventions in the flowsheet.   Outcome: Progressing Towards Goal  Note: Pressure Injury Interventions:  Sensory Interventions: Assess changes in LOC, Avoid rigorous massage over bony prominences, Float heels, Keep linens dry and wrinkle-free    Moisture Interventions: Absorbent underpads, Apply protective barrier, creams and emollients, Internal/External urinary devices    Activity Interventions: Pressure redistribution bed/mattress(bed type)    Mobility Interventions: Float heels, HOB 30 degrees or less, Pressure redistribution bed/mattress (bed type)    Nutrition Interventions: Document food/fluid/supplement intake, Offer support with meals,snacks and hydration    Friction and Shear Interventions: Apply protective barrier, creams and emollients, Feet elevated on foot rest, Foam dressings/transparent film/skin sealants, HOB 30 degrees or less, Minimize layers                Problem: Dyspnea Due to End of Life  Goal: Demonstrate understanding of and ability to manage respiratory symptoms at end of life  Outcome: Progressing Towards Goal     Problem: Communication Deficit  Goal: Effectively communicate symptoms, needs, and concerns  Outcome: Progressing Towards Goal     Problem: Imminent Death  Goal: Collaborate with patient/family/caregiver/interdisciplinary team to minimize and manage end of life symptoms  Outcome: Progressing Towards Goal     Problem: Pain  Goal: *Control of Pain  Outcome: Progressing Towards Goal     Problem: General Wound Care  Goal: *Infected Wound: Prevention of further infection and promotion of healing  Description: Infection control procedures (eg: clean dressings, clean gloves, hand washing, precautions to isolate wound from contamination, sterile instruments used for wound debridement) should be implemented.   Outcome: Progressing Towards Goal

## 2021-09-23 NOTE — PROGRESS NOTES
6063- The patient report taken from Jorge Wellington RN. The patient identified by name and . The patient is GIP with diagnosis of The patient's discharge plan is to remain in the [facility] until she meets her demise. Discharge plan is reviewed frequently. The bed is low and locked with tab alert in place. The bed rails are up times two. Will continue to monitor. 0824-Patient is moaning and groaning while being bathed by the CNA. The pain level is at 8/10 using non verbal scale. Patient states to leave her alone. She is agitated. Medicated with Haldol 2 mg for agitation and with Morphine 2 mg. Both medications administered subcutaneous in left upper arm. The CNA is attempting to feed the patient. The patient covers her mouth and states, \" Oh, lordy somebody help me. \" The patient was covered up and is left alone per her request. The patient is covered with sheet and blanket. All safety precautions are in place. 0900- The patient is resting quietly in the bed now. Pain level is at 0/10 using non verbal scale. Medications resolved the anxiety. No signs of SOB or nausea / vomit observed. 1115- The patient's daughter is at the bedside. She was updated on the patient's condition and assured that we are medicating and feeding the patient. The patient is resting quietly in the bed on her right side. 1236- The patient is grimacing. Morphine 2 mg administered subcutaneous for pain of 4/10. Patient's daughter remains at the bedside. The daughter refused to allow the Haldol to be given at this time. She is waiting on a nephew to come. 1300- The patient is now resting quietly in the bed with no signs of distress observed. Pain is at 0/10 using non verbal scale. No signs of anxiety, SOB or nausea / vomit observed. The daughter remains at the bedside. [phone]- The patient has a granddaughter who is in to visit and was assisted to dress out to go in the patient's room.  The patient is resting quietly in the bed    1506- Patient's granddaughter and her daughter remain at the bedside. The patient was medicated with Morphine 2 mg subcutaneous for pain of 4/10 using non verbal scale. Haldol 2 mg administered subcutaneous for anxiety. Educated family on hospice philosophy. Educated that hospice was about comfort care not healing care. Prayed with the family. Encouraged they call with any needs. The daughter and the granddaughter voiced understanding. 1530- The patient is resting quietly in the bed with no signs of distress observed. The pain level is 0/10 using non verbal scale. No signs of anxiety, SOB or nausea / vomit observed. The patient has two more visitors in to visit. 1800- Patient continues to rest with no signs of distress observed. Respirations are at 14 minute. Reported off to Norval Mcardle, RN and completed walking rounds. Julian Hankins

## 2021-09-23 NOTE — PROGRESS NOTES
Problem: Anticipatory Grief  Goal: Grief heard and acknowledged, anxiety reduced, patient coping identified, patient/family expressed gratitude  9/23/2021 1715 by Padmini Eckert  Outcome: Progressing Towards Goal  9/23/2021 1714 by Padmini Eckert  Outcome: Progressing Towards Goal

## 2021-09-23 NOTE — PROGRESS NOTES
Initial Spiritual Assessment:    Situation:  Due to COVID restrictions, Chaplains are not currently going into patient rooms when patient is COVID+. Encounter took place via phone conversation with patient's daughter, Fernando Chaidez. Background:  Patient is a 80year old female with dx of COVID, admitted on 9/20/21. Assessment: Patient/Family are [place], but patient's daughter reports she is not currently active in any community of rj. Primary spiritual concerns/issue is anticipatory grief. Support provided during initial assessment through active listening/life review, words of encouragement, and prayer. Response:  Patient/family receptive to spiritual/emotional support. Plan of care:  Spiritual/emotional support to be provided as needed, requested, or referred.

## 2021-09-23 NOTE — PROGRESS NOTES
Problem: Risk for Spread of Infection  Goal: Prevent transmission of infectious organism to others  Description: Prevent the transmission of infectious organisms to other patients, staff members, and visitors. Outcome: Progressing Towards Goal   Patient is on strict precautions. Staff and visitors with follow the precautions throughout the patient's stay. Problem: Falls - Risk of  Goal: *Absence of Falls  Description: Document Mook Sanchez Fall Risk and appropriate interventions in the flowsheet. 9/23/2021 1953 by Cleo Kimball RN  Outcome: Progressing Towards Goal  Note: Fall Risk Interventions:       Mentation Interventions: Adequate sleep, hydration, pain control, Bed/chair exit alarm (door closed , Droplet Precaution)    Medication Interventions: Bed/chair exit alarm    Elimination Interventions: Bed/chair exit alarm, Call light in reach, Toileting schedule/hourly rounds     Ms. Timo Lindsey will be free from falls throughout her stay in hospice  Problem: Patient Education: Go to Patient Education Activity  Goal: Patient/Family Education  9/23/2021 1953 by Cleo Kimball RN  Outcome: Progressing Towards Goal  9/23/2021 1952 by Cleo Kimball RN  Outcome: Progressing Towards Goal   Family was educated on Hospice philosophy  Problem: Pressure Injury - Risk of  Goal: *Prevention of pressure injury  Description: Document Moustapha Scale and appropriate interventions in the flowsheet.   Outcome: Progressing Towards Goal  Note: Pressure Injury Interventions:  Sensory Interventions: Assess changes in LOC, Avoid rigorous massage over bony prominences, Float heels, Keep linens dry and wrinkle-free    Moisture Interventions: Absorbent underpads, Apply protective barrier, creams and emollients, Internal/External urinary devices    Activity Interventions: Pressure redistribution bed/mattress(bed type)    Mobility Interventions: Float heels, HOB 30 degrees or less, Pressure redistribution bed/mattress (bed type)    Nutrition Interventions: Document food/fluid/supplement intake, Offer support with meals,snacks and hydration    Friction and Shear Interventions: Apply protective barrier, creams and emollients, Feet elevated on foot rest, Foam dressings/transparent film/skin sealants, HOB 30 degrees or less, Minimize layers       Ms Tara Jha will be free from new pressure injury throughout her stay in hospice  Problem: Dyspnea Due to End of Life  Goal: Demonstrate understanding of and ability to manage respiratory symptoms at end of life  Outcome: Progressing Towards Goal   Medications and oxygen with resolve symptoms of dyspnea  Problem: General Wound Care  Goal: *Infected Wound: Prevention of further infection and promotion of healing  Description: Infection control procedures (eg: clean dressings, clean gloves, hand washing, precautions to isolate wound from contamination, sterile instruments used for wound debridement) should be implemented.   Outcome: Progressing Towards Goal   Wound will be clean and dressed during stay

## 2021-09-23 NOTE — PROGRESS NOTES
Problem: Anxiety/Agitation  Goal: Verbalize or staff assess the ability to manage anxiety  Description: The patient/family/caregiver will verbalize and demonstrate ability to manage the patient's anxiety throughout hospice care.   Outcome: Progressing Towards Goal   Medications and calm environment with resolve anxiety / agitation  Problem: Infection - Risk of, Urinary Catheter-Associated Urinary Tract Infection  Goal: *Absence of infection signs and symptoms  Outcome: Progressing Towards Goal   Ms Roro Tyson will be free from UTI during her stay at [facility]

## 2021-09-23 NOTE — PROGRESS NOTES
Problem: Risk for Spread of Infection  Goal: Prevent transmission of infectious organism to others  Description: Prevent the transmission of infectious organisms to other patients, staff members, and visitors. Outcome: Progressing Towards Goal     Problem: Patient Education:  Go to Education Activity  Goal: Patient/Family Education  Outcome: Progressing Towards Goal     Problem: Falls - Risk of  Goal: *Absence of Falls  Description: Document Dc Harp Fall Risk and appropriate interventions in the flowsheet. Outcome: Progressing Towards Goal  Note: Fall Risk Interventions:       Mentation Interventions: Adequate sleep, hydration, pain control, Bed/chair exit alarm (door closed , Droplet Precaution)    Medication Interventions: Bed/chair exit alarm    Elimination Interventions: Bed/chair exit alarm, Call light in reach, Toileting schedule/hourly rounds              Problem: Pressure Injury - Risk of  Goal: *Prevention of pressure injury  Description: Document Moustapha Scale and appropriate interventions in the flowsheet.   Outcome: Progressing Towards Goal  Note: Pressure Injury Interventions:  Sensory Interventions: Assess changes in LOC, Avoid rigorous massage over bony prominences, Float heels, Keep linens dry and wrinkle-free    Moisture Interventions: Absorbent underpads, Apply protective barrier, creams and emollients, Internal/External urinary devices    Activity Interventions: Pressure redistribution bed/mattress(bed type)    Mobility Interventions: Float heels, HOB 30 degrees or less, Pressure redistribution bed/mattress (bed type)    Nutrition Interventions: Document food/fluid/supplement intake, Offer support with meals,snacks and hydration    Friction and Shear Interventions: Apply protective barrier, creams and emollients, Feet elevated on foot rest, Foam dressings/transparent film/skin sealants, HOB 30 degrees or less, Minimize layers                Problem: Dyspnea Due to End of Life  Goal: Demonstrate understanding of and ability to manage respiratory symptoms at end of life  Outcome: Progressing Towards Goal     Problem: Communication Deficit  Goal: Effectively communicate symptoms, needs, and concerns  Outcome: Progressing Towards Goal     Problem: Imminent Death  Goal: Collaborate with patient/family/caregiver/interdisciplinary team to minimize and manage end of life symptoms  Outcome: Progressing Towards Goal     Problem: Pain  Goal: *PALLIATIVE CARE:  Alleviation of Pain  Outcome: Progressing Towards Goal     Problem: General Wound Care  Goal: *Infected Wound: Prevention of further infection and promotion of healing  Description: Infection control procedures (eg: clean dressings, clean gloves, hand washing, precautions to isolate wound from contamination, sterile instruments used for wound debridement) should be implemented.   Outcome: Progressing Towards Goal

## 2021-09-23 NOTE — [facility] IDG CHAPLAIN NOTES
Patient: Denny Levine    Date: 09/22/21  Time: 9:48 PM    [facility]  Notes    / Grief Counselor has reviewed  Initial Comprehensive Assessment and plan of care. Bereavement and Spiritual Care Assessments to be completed and plan of care put in place to meet the needs, requests and referrals.         Signed by: Clement Daley

## 2021-09-24 NOTE — PROGRESS NOTES
Progress Note    Patient: Claudia Mishra MRN: [id number]  SSN: [id number]    YOB: 1925  Age: 80 y.o. Sex: female      Admit Date: 9/20/2021    LOS: 4 days     Subjective:     Lethargic. Agitated at times. NPO. Has required morphine x 6 SQ for pain/dyspnea, haldol x 4 and ativan x 1 for agitation. Review of Systems:  Review of systems not obtained due to patient factors. Objective:     Vitals:    09/22/21 0636 09/22/21 1631 09/23/21 0307 09/23/21 0835   BP: 121/64 110/72 126/60 119/75   Pulse:  (!) 104 (!) 104 (!) 112   Resp:  20 20 22   Temp: (!) 96.2 °F (35.7 °C) (!) 96.5 °F (35.8 °C) (!) 96.4 °F (35.8 °C)         Intake and Output:  Current Shift: No intake/output data recorded. Last three shifts: No intake/output data recorded. Physical Exam: (Exam completed with input from primary RN due to Covid-19)  GENERAL: fatigued, mild distress, appears older than stated age, toxic, lethargic  LUNG: Coarse diminished breath sounds with shallow respirations. HEART: regular rate and rhythm  ABDOMEN: soft, non-tender. Bowel sounds hypoactive. : Linn catheter with mitch urine. EXTREMITIES:  extremities with no cyanosis. Moderate lower ext edema. + pulses. Bilateral lower extremities with contractures. SKIN: Warm to touch. Stage 3 on the sacrum with dressing intact. NEUROLOGIC: Lethargic. Generalized weakness. Bedbound. PSYCHIATRIC: agitated    Lab/Data Review:  No new labs resulted in the last 24 hours.     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)      Acute midline low back pain without sciatica (9/20/2021)      Pressure injury of sacral region, stage 2 ([facility] [address] (9/17/2021)        Plan:     Current Facility-Administered Medications   Medication Dose Route Frequency    fentaNYL (DURAGESIC) 12 mcg/hr patch 1 Patch  1 Patch TransDERmal Q72H    morphine injection 2 mg  2 mg SubCUTAneous Q20MIN PRN    haloperidol lactate (HALDOL) injection 2 mg  2 mg SubCUTAneous Q1H PRN    acetaminophen (TYLENOL) suppository 650 mg  650 mg Rectal Q3H PRN    LORazepam (ATIVAN) tablet 1 mg  1 mg Oral Q4H PRN    bisacodyL (DULCOLAX) suppository 10 mg  10 mg Rectal PRN    haloperidol lactate (HALDOL) injection 2 mg  2 mg SubCUTAneous Q1H PRN    glycopyrrolate (ROBINUL) injection 0.2 mg  0.2 mg SubCUTAneous Q4H PRN       9/20: Admitted GIP with metabolic encephalopathy for management of pain, dyspnea and agitation. 1. Pain: Morphine 2mg IV/SQ Q20 minutes as needed. Fentanyl 12mcg/hr patch in place. 2. Dyspnea: Morphine 2mg IV/SQ Q20 minutes as needed. Glycopyrrolate 0.2mg q4 prn secretions. Oxygen at   2 L/min prn.    3. Agitation: Haloperidol 2mg IV/SQ Q1 hour prn or Lorazepam 1mg PO/SL q4 hours prn.    4. Family/Pt Support: Medications and plan of care discussed with nursing staff. Will continue to monitor for symptoms and adjust medications as needed to maintain patient comfort. PPS 10%. Case discussed with Dr. Linn Chaudhry and in [facility] meeting today. No change in plan of care.      Signed By: Curt Maurice NP     September 24, 2021

## 2021-09-24 NOTE — [facility] IDG MASTER NOTE
Hospice [facility] Collabor[facility]  Date: 09/24/21  Time: 2:58 PM    ___________________    Patient: Gilbert Bradshaw  Coverage Information:     Payor: [place]     Plan: [facility] MEDICARE PART A AND B     Subscriber ID: [id number]     Phone Number:   MRN: [id number]    Current Benefit Period: Benefit Period 1  Start Date: 9/20/2021  End Date: 12/18/2021        Hospice Attending Provider: Matilda Hernandez MD  [address] [place] [address]  [id number]  Phone: [phone]  Fax: [phone]    Level of Care: General Inpatient Care      ___________________    Diagnoses:  Diagnoses of Metabolic encephalopathy, Acute midline low back pain without sciatica, COVID-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     Current Medications:    Current Facility-Administered Medications:     fentaNYL (DURAGESIC) 12 mcg/hr patch 1 Patch, 1 Patch, TransDERmal, Q72H, Wolfgang Harada, NP, 1 Patch at 09/22/21 1552    morphine injection 2 mg, 2 mg, SubCUTAneous, Q20MIN PRN, 2 mg at 09/24/21 1307 **OR** [DISCONTINUED] morphine injection 2 mg, 2 mg, IntraVENous, Q20MIN PRN, Matilda Hernandez MD, 2 mg at 09/22/21 1303    haloperidol lactate (HALDOL) injection 2 mg, 2 mg, SubCUTAneous, Q1H PRN **OR** [DISCONTINUED] haloperidol lactate (HALDOL) injection 2 mg, 2 mg, IntraVENous, Q1H PRN, Matilda Hernandez MD    acetaminophen (TYLENOL) suppository 650 mg, 650 mg, Rectal, Q3H PRN, Matilda Hernandez MD    LORazepam (ATIVAN) tablet 1 mg, 1 mg, Oral, Q4H PRN, Matilda Hernandez MD, 1 mg at 09/24/21 0723    bisacodyL (DULCOLAX) suppository 10 mg, 10 mg, Rectal, PRN, Matilda Hernandez MD    haloperidol lactate (HALDOL) injection 2 mg, 2 mg, SubCUTAneous, Q1H PRN, 2 mg at 09/24/21 1308 **OR** [DISCONTINUED] haloperidol lactate (HALDOL) injection 2 mg, 2 mg, IntraVENous, Q1H PRN, Matilda Hernandez MD, 2 mg at 09/22/21 1303    glycopyrrolate (ROBINUL) injection 0.2 mg, 0.2 mg, SubCUTAneous, Q4H PRN, Matilda Hernandez MD    Orders:  Orders Placed This Encounter    IP CONSULT TO SPIRITUAL CARE     Standing Status:   Standing     Number of Occurrences:   1     Order Specific Question:   Reason for Consult: Answer: Once on week one, then PRN. For [facility] Patients Only. For contracted patients, their primary hospice will continue to manage spiritual care needs.  DIET PLEASURE     Standing Status:   Standing     Number of Occurrences:   1    VITAL SIGNS     Standing Status:   Standing     Number of Occurrences:   1    NURSING-MISCELLANEOUS: Comfort Care Measures CONTINUOUS     Standing Status:   Standing     Number of Occurrences:   1     Order Specific Question:   Description of Order:     Answer:   Comfort Care Measures    DENNISON CATHETER, CARE     1. Dennison Catheter care every shift and PRN  2. Notify Physician of Dennison Catheter leakage, occlusion, gross adherent sediment or accidental removal  3. Change Dennison 30 days after insertion. Standing Status:   Standing     Number of Occurrences:   [id number]    BLADDER CHECKS     May scan bladder PRN for urinary retention and or patient discomfort     Standing Status:   Standing     Number of Occurrences:   07689    NURSING ASSESSMENT:  SPECIFY Assess for GIP, routine, or respite level of care. Q SHIFT Routine     Standing Status:   Standing     Number of Occurrences:   1     Order Specific Question:   Please describe the test or procedure you would like to order. Answer:   Assess for GIP, routine, or respite level of care.     PAIN ASSESSMENT Pain and Symptoms: Assess ever 4 hours and PRN, for GIP level of care. PRN Routine     Standing Status:   Standing     Number of Occurrences:   70182     Order Specific Question:   Please describe the test or procedure you would like to order. Answer:   Pain and Symptoms: Assess ever 4 hours and PRN, for GIP level of care.  BEDREST, COMPLETE     Standing Status:   Standing     Number of Occurrences:   1    NURSING ASSESSMENT:  SPECIFY provide patient with available PRN morphine and ativan doses prior to wound care and full bath CONTINUOUS Routine     Standing Status:   Standing     Number of Occurrences:   1     Order Specific Question:   Please describe the test or procedure you would like to order. Answer:   provide patient with available PRN morphine and ativan doses prior to wound care and full bath    WOUND CARE, DRESSING CHANGE     Wound Care:  Location:sacrum  Stage 3: Cleanse ulcer base with protocol wound cleanser loosely pack ulcer cavity with calcium alginate fiber  Cover with heavy drainage dressing pack  Change dressing/ packing every 72 hrs and as needed for soak through or heavy soiling     Standing Status:   Standing     Number of Occurrences:   5    NURSING-MISCELLANEOUS: PPE required for any aerosolizing procedure (ie, intubation, bronchoscopy). Surgical mask on patient for any transport outside room. Patient should be single room, closed door with notification of droplet plus isolation. CON. .. Standing Status:   Standing     Number of Occurrences:   1     Order Specific Question:   Description of Order:     Answer:   PPE required for any aerosolizing procedure (ie, intubation, bronchoscopy). Surgical mask on patient for any transport outside room. Patient should be single room, closed door with notification of droplet plus isolation.     DRESSING CHANGE - PICC, MLC, SUBCUTANEOUS AND ACCESSED PORT DRESSING CHANGE     PICC, MLC, SUBCUTANEOUS AND ACCESSED PORT DRESSING CHANGE:  Change catheter site dressing every 7 days and prn if site dressing becomes damp loosened or visibly soiled     Standing Status:   Standing     Number of Occurrences:   52089    DO NOT RESUSCITATE     Standing Status:   Standing     Number of Occurrences:   1     Order Specific Question:   Comfort Measures Only? Answer: Yes    DROPLET PLUS     Droplet Plus Isolation consists of: Droplet plus Contact plus Eye Protection (face shield/goggles)     Standing Status:   Standing     Number of Occurrences:   1    OXYGEN CANNULA Liters per minute: 2; Indications for O2 therapy: RESPIRATORY DISTRESS PRN Routine     Standing Status:   Standing     Number of Occurrences:   74054     Order Specific Question:   Liters per minute: Answer:   2     Order Specific Question:   Indications for O2 therapy     Answer:   RESPIRATORY DISTRESS    benzonatate (TESSALON) 100 mg capsule     Sig: Take 100 mg by mouth three (3) times daily as needed.  amLODIPine (NORVASC) 10 mg tablet     Sig: Take 10 mg by mouth daily.  acetaminophen (TYLENOL) 500 mg tablet     Sig: Take 1-2 Tablets by mouth.  bisacodyL (DULCOLAX) 10 mg supp     Sig: Insert 10 mg into rectum daily as needed.  carvediloL (COREG) 25 mg tablet     Sig: Take 25 mg by mouth two (2) times a day.  dexAMETHasone (DECADRON) 6 mg tablet     Sig: Take 6 mg by mouth daily.  famotidine (PEPCID) 20 mg tablet     Sig: Take 1 Tablet by mouth daily.  gabapentin (NEURONTIN) 300 mg capsule     Sig: Take 300 mg by mouth three (3) times daily as needed.  hydrALAZINE (APRESOLINE) 50 mg tablet     Sig: Take 50 mg by mouth three (3) times daily.  Purelax 17 gram packet     Sig: Take 1 Packet by mouth daily.     traMADoL (ULTRAM) 50 mg tablet     Sig: TAKE 1 TABLET AS NEEDED ORAL TWICE A DAY 30 DAYS    DISCONTD: sodium chloride (NS) flush 3 mL    DISCONTD: sodium chloride (NS) flush 3 mL    morphine injection 2 mg    DISCONTD: morphine injection 2 mg    haloperidol lactate (HALDOL) injection 2 mg    DISCONTD: haloperidol lactate (HALDOL) injection 2 mg    acetaminophen (TYLENOL) suppository 650 mg    LORazepam (ATIVAN) tablet 1 mg    bisacodyL (DULCOLAX) suppository 10 mg    haloperidol lactate (HALDOL) injection 2 mg    DISCONTD: haloperidol lactate (HALDOL) injection 2 mg    glycopyrrolate (ROBINUL) injection 0.2 mg    fentaNYL (DURAGESIC) 12 mcg/hr patch 1 Patch    INITIAL PHYSICIAN ORDER: HOSPICE Level Of Care: General Inpatient; Reason for Admission: complex symptom management     Standing Status:   Standing     Number of Occurrences:   1     Order Specific Question:   Status     Answer:   Hospice     Order Specific Question:   Level Of Care     Answer:   General Inpatient     Order Specific Question:   Reason for Admission     Answer:   complex symptom management     Order Specific Question:   Inpatient Hospitalization Certified Necessary for the Following Reasons     Answer:   3. Patient receiving treatment that can only be provided in an inpatient setting (further clarification in H&P documentation)     Order Specific Question:   Admitting Diagnosis     Answer:   Metabolic encephalopathy [914.42. ICD-9-CM]     Order Specific Question:   Terminal Prognosis Diagnosis(es)     Answer:   COVID-19 long hauler [5286707]     Order Specific Question:   Terminal Prognosis Diagnosis(es)     Answer:   Hypoalbuminemia due to protein-calorie malnutrition [facility] [7539554]     Order Specific Question:   Terminal Prognosis Diagnosis(es)     Answer:   Debilitated patient [419526]     Order Specific Question:   Terminal Prognosis Diagnosis(es)     Answer:   Dysphagia [0448374]     Order Specific Question:   Terminal Prognosis Diagnosis(es)     Answer:   Hyponatremia with decreased serum osmolality [3881745]     Order Specific Question:   Terminal Prognosis Diagnosis(es)     Answer:   Hypomagnesemia [725564]     Order Specific Question:   Terminal Prognosis Diagnosis(es)     Answer:   Lobar pneumonia, unspecified organism [facility]) [8516006]     Order Specific Question:   Terminal Prognosis Diagnosis(es)     Answer:   Decubitus ulcer of sacral region, stage 3 [facility]) [[id number]]     Order Specific Question:   Terminal Prognosis Diagnosis(es)     Answer:   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 [facility]) [[id number]]     Order Specific Question:   Terminal Prognosis Diagnosis(es)     Answer:   Neuropathic pain [9416582]     Order Specific Question:   Terminal Prognosis Diagnosis(es)     Answer: Anemia of chronic disease [935759]     Order Specific Question:   Terminal Prognosis Diagnosis(es)     Answer:   Hypokalemia due to inadequate potassium intake [3242953]     Order Specific Question:   Terminal Prognosis Diagnosis(es)     Answer:   Hypertensive cardiopathy [701006]     Order Specific Question:   Terminal Prognosis Diagnosis(es)     Answer:   Chronic congestive heart failure with left ventricular diastolic dysfunction [facility]) [[id number]]     Order Specific Question:   Admitting Physician     Answer:   Andrae Gavin Specific Question:   Attending Physician     Answer:   Andrae Gavin Specific Question:   Discharge Plan:     Answer: Other (Specify)     Comments:   end of life    IP CONSULT TO SOCIAL WORK     Standing Status:   Standing     Number of Occurrences:   1     Order Specific Question:   Reason for Consult: Answer: For [facility] Patients Only. For contracted patients, their primary hospice will continue to manage social work needs. Allergies:   Allergies   Allergen Reactions    Penicillins Rash     Tolerated Ceftriaxone 8/5/21 and cefdinir 3/28/19       Care Plan:  Encounter Problems (Active)     Problem: Anticipatory Grief     Dates: Start: 09/23/21       Disciplines: Interdisciplinary    Goal: Grief heard and acknowledged, anxiety reduced, patient coping identified, patient/family expressed gratitude     Dates: Start: 09/23/21   Expected End: 10/01/21       Disciplines: Interdisciplinary    Intervention: Assess grief responses     Dates: Start: 09/23/21       Description: Assess for feelings of grief       Intervention: Support grieving process     Dates: Start: 09/23/21       Description: Address feelings of loss, anticipatory grief, expression of concern. Problem: Anxiety/Agitation     Dates: Start: 09/23/21       Disciplines: Interdisciplinary    Goal: Verbalize or staff assess the ability to manage anxiety     Dates: Start: 09/23/21   Expected End: 09/26/21       Description: The patient/family/caregiver will verbalize and demonstrate ability to manage the patient's anxiety throughout hospice care. Disciplines: Interdisciplinary    Intervention: Assess for anxiety/agitation     Dates: Start: 09/23/21       Description: Assess for signs and symptoms of anxiety and agitation. Intervention: Instruct/Implement strategies to reduce anxiety/agitation     Dates: Start: 09/23/21       Description: Instruct patient/caregiver on strategies to reduce anxiety/agitation. Problem: Communication Deficit     Dates: Start: 09/20/21       Disciplines: Interdisciplinary    Goal: Effectively communicate symptoms, needs, and concerns     Dates: Start: 09/20/21   Expected End: 09/26/21       Disciplines: Interdisciplinary    Intervention: Assess spiritual needs     Dates: Start: 09/20/21          Intervention: Expression of emotions     Dates: Start: 09/20/21          Intervention: Instruct end of life support     Dates: Start: 09/20/21       Description: Assess mental status and identify loss of short term memory, confusion, impaired cognitive ability, alertness, and orientation.              Problem: Dyspnea Due to End of Life     Dates: Start: 09/20/21       Disciplines: Interdisciplinary    Goal: Demonstrate understanding of and ability to manage respiratory symptoms at end of life     Dates: Start: 09/20/21   Expected End: 09/26/21 Disciplines: Interdisciplinary    Intervention: Assess for signs and symptoms of dyspnea     Dates: Start: 09/20/21          Intervention: Jeannie Ha on causes/symptoms of dyspnea     Dates: Start: 09/20/21          Intervention: Jeannie  patient/caregiver/family on strategies to effectively manage dyspnea     Dates: Start: 09/20/21                Problem: Emotional Support Needs     Dates: Start: 09/22/21       Description: Pt, pt's daughter Radha Daly, and pt's family will have their emotional support needs met weekly by SW. Disciplines: Interdisciplinary    Goal: Patient/family is receiving emotional support     Dates: Start: 09/22/21   Expected End: 10/01/21       Description: Pt, Radha Daly, and family will receive emotional support weekly from SW through weekly check-ins, education on the hospice philosophy, rapport building, active listening, and validation of feelings throughout pt's hospice journey. Disciplines: Interdisciplinary    Intervention: Assess for emotional distress     Dates: Start: 09/22/21       Description: SW will assess for emotional distress in pt, José Miguel VANN, and family through the use of open ended questions and motivational interviewing scaling questions. Intervention: Provide emotional support of the family's cultural expressions of grief and loss     Dates: Start: 09/22/21       Description: SW will provide emotional support of the family's cultural expression of grief, loss, and response to illness. Problem: Falls - Risk of     Dates: Start: 09/20/21       Disciplines: Interdisciplinary    Goal: *Absence of Falls     Dates: Start: 09/20/21   Expected End: 09/26/21       Description: Document Wild Reed Fall Risk and appropriate interventions in the flowsheet.     Disciplines: Interdisciplinary          Problem: General Wound Care     Dates: Start: 09/20/21       Disciplines: Interdisciplinary    Goal: *Infected Wound: Prevention of further infection and promotion of healing     Dates: Start: 21   Expected End: 21       Description: Infection control procedures (eg: clean dressings, clean gloves, hand washing, precautions to isolate wound from contamination, sterile instruments used for wound debridement) should be implemented. Disciplines: Interdisciplinary       Goal: Interventions     Dates: Start: 21   Expected End: 21       Disciplines: Interdisciplinary    Intervention: Pain management     Dates: Start: 21          Intervention: Wound assessment, including wound bed description and dimensions     Dates: Start: 21          Intervention: Wound dressing change, sterile or clean     Dates: Start: 21          Intervention: Wound care (eg: Remove necrotic tissue; infection control/protection; absorb exudate; fill dead space; maintain moisture; maintain constant temperature of wound)     Dates: Start: 21          Intervention: Skin assessment     Dates: Start: 21          Intervention: Skin monitoring and care (e.g. skin cleansing; keep dry, moisturize, utilization of  lotion and/or skin barrier cream)     Dates: Start: 21                Problem: Imminent Death     Dates: Start: 21       Disciplines: Interdisciplinary    Goal: Collaborate with patient/family/caregiver/interdisciplinary team to minimize and manage end of life symptoms     Dates: Start: 21   Expected End: 21       Disciplines: Interdisciplinary    Intervention: Willma Leventhal on end of life issues     Dates: Start: 21       Description: Willma Leventhal patient/caregiver on end of life issues. Intervention: Instruct on impending death     Dates: Start: 21       Description: Instruct on impending death, need for  arrangements, physical care, patient wishes, hospice role, and signs/symptoms of approaching death.        Intervention: Instruct on what to do at death     Dates: Start: 21       Description: Instruct caregiver on what to do at time of death. Intervention: Plan for death     Dates: Start: 09/20/21       Description: Assess patient/caregiver emotional readiness and plan for death and assist as needed. Problem: Infection - Risk of, Urinary Catheter-Associated Urinary Tract Infection     Dates: Start: 09/23/21       Disciplines: Interdisciplinary    Goal: *Absence of infection signs and symptoms     Dates: Start: 09/23/21   Expected End: 09/26/21       Disciplines: Interdisciplinary    Intervention: Urinary catheter needs assessment     Dates: Start: 09/23/21       Description: Medically/surgically unstable; Chemically paralyzed; Obstruction/retention; Strict I/Os;  Surgical procedure; Comfort Care; Multiple Stage 3 or 4 pressure ulcers, chest to knees       Intervention: Urine assessment (eg: Clarity; color; odor; volume)     Dates: Start: 09/23/21          Intervention: Infection signs and symptoms monitoring - urinary tract (eg: Flank or suprapubic pain; burning; fever; dysuria; frequency; urgency; cloudy/bloody/foul odor urine; confusion/agitation; incontinence)     Dates: Start: 09/23/21          Intervention: Lab monitoring (eg: Urinalysis; culture and sensitivity; complete blood count with differential)     Dates: Start: 09/23/21          Intervention: Urinary catheter management (eg: Closed urinary catheter system maintenance; urinary catheter patency with free downhill flow; perineal care; monitor intake and output)     Dates: Start: 09/23/21          Intervention: Urinary catheter discontinuation     Dates: Start: 09/23/21                Problem: Pain     Dates: Start: 09/20/21       Disciplines: Nurse, Interdisciplinary, RT    Goal: *Control of Pain     Dates: Start: 09/20/21   Expected End: 09/26/21       Disciplines: Nurse, Interdisciplinary, RT    Intervention: Assess pain characteristics (eg: Intensity scale; onset; location; quality; severity; duration; frequency; radiation)     Dates: Start: 09/20/21          Intervention: Assess pain management - barriers (eg: Past pain experiences)     Dates: Start: 09/20/21          Intervention: Identify pain expectations (eg: Patient's pain goal; somatic experiences; behavioral changes; affect)     Dates: Start: 09/20/21          Intervention: Identify pain medication concerns (eg: Cultural considerations; addiction concerns)     Dates: Start: 09/20/21          Intervention: Support system identification (eg: Caregiver; community resource; family; friends; [employer]; support group)     Dates: Start: 09/20/21          Intervention: Monitor for change in patient condition (eg:  Vital signs changes; changes in level of consciousness; nausea; behavioral changes)     Dates: Start: 09/20/21          Intervention: Medication side-effect assessment     Dates: Start: 09/20/21          Intervention: Pain-relief response reassessment (eg: Frequency based on route of administration; effectiveness)     Dates: Start: 09/20/21             Goal: *PALLIATIVE CARE:  Alleviation of Pain     Dates: Start: 09/20/21   Expected End: 09/26/21       Disciplines: Nurse, Interdisciplinary, RT    Intervention: Refer patient for holistic services per facility     Dates: Start: 09/20/21                Problem: Patient Education:  Go to Education Activity     Dates: Start: 09/20/21       Disciplines: Interdisciplinary    Goal: Patient/Family Education     Dates: Start: 09/20/21   Expected End: 09/29/21       Disciplines: Interdisciplinary          Problem: Patient Education: Go to Patient Education Activity     Dates: Start: 09/20/21       Disciplines: Interdisciplinary    Goal: Patient/Family Education     Dates: Start: 09/20/21   Expected End: 09/26/21       Disciplines: Interdisciplinary          Problem: Patient Education: Go to Patient Education Activity     Dates: Start: 09/20/21       Disciplines: Interdisciplinary    Goal: Patient/Family Education     Dates: Start: 09/20/21 Disciplines: Interdisciplinary          Problem: Patient Education: Go to Patient Education Activity     Dates: Start: 09/20/21       Disciplines: Interdisciplinary    Goal: Patient/Family Education     Dates: Start: 09/20/21   Expected End: 09/26/21       Disciplines: Interdisciplinary          Problem: Patient Education: Go to Patient Education Activity     Dates: Start: 09/20/21       Disciplines: Nurse, Interdisciplinary, RT    Goal: Patient/Family Education     Dates: Start: 09/20/21       Disciplines: Nurse, Interdisciplinary, RT          Problem: Pressure Injury - Risk of     Dates: Start: 09/20/21       Disciplines: Interdisciplinary    Goal: *Prevention of pressure injury     Dates: Start: 09/20/21   Expected End: 09/26/21       Description: Document Moustapha Scale and appropriate interventions in the flowsheet. Disciplines: Interdisciplinary          Problem: Pressure Injury - Risk of     Dates: Start: 09/20/21       Disciplines: Interdisciplinary    Goal: *Prevention of pressure injury     Dates: Start: 09/20/21       Description: Document Moustapha Scale and appropriate interventions in the flowsheet. Disciplines: Interdisciplinary          Problem: Risk for Spread of Infection     Dates: Start: 09/20/21       Disciplines: Interdisciplinary    Goal: Prevent transmission of infectious organism to others     Dates: Start: 09/20/21   Expected End: 09/26/21       Description: Prevent the transmission of infectious organisms to other patients, staff members, and visitors.     Disciplines: Interdisciplinary    Intervention: Verify correct isolation order has been placed for confirmed or suspected infection     Dates: Start: 09/20/21          Intervention: Proper isolation precautions placed on patient door     Dates: Start: 09/20/21          Intervention: Proper PPE utilized by all who enter patient's room     Dates: Start: 09/20/21          Intervention: Proper hand hygiene     Dates: Start: 09/20/21 Description: Use alcohol based hand  or soap and water.        Intervention: Proper decontamination of surfaces and shared equipment/devices     Dates: Start: 09/20/21               Care Plan Problems/Goals      Progressing Towards Goal (18)      Prevent transmission of infectious organism to others (Risk for Spread of Infection)    Disciplines:  Interdisciplinary Expected end:  09/26/21        Outcome: Progressing Towards Goal By Connie Castro RN on 09/24/21 0251            Patient/Family Education (Patient Education:  Go to Education Activity)    Disciplines:  Interdisciplinary Expected end:  09/29/21        Outcome: Progressing Towards Goal By Hood Sanchez RN on 09/23/21 1952            *Absence of Falls (Falls - Risk of)    Disciplines:  Interdisciplinary Expected end:  09/26/21        Outcome: Progressing Towards Goal By Connie Castro RN on 09/24/21 0252            Patient/Family Education (Patient Education: Go to Patient Education Activity)    Disciplines:  Interdisciplinary Expected end:  09/26/21        Outcome: Progressing Towards Goal By Hood Sanchez RN on 09/23/21 1953            *Prevention of pressure injury (Pressure Injury - Risk of)    Disciplines:  Interdisciplinary Expected end:  09/26/21        Outcome: Progressing Towards Goal By Connie Castro RN on 09/24/21 1014            Demonstrate understanding of and ability to manage respiratory symptoms at end of life (Dyspnea Due to End of Life)    Disciplines:  Interdisciplinary Expected end:  09/26/21        Outcome: Progressing Towards Goal By Hood Sanchez RN on 09/23/21 1953            Effectively communicate symptoms, needs, and concerns (Communication Deficit)    Disciplines:  Interdisciplinary Expected end:  09/26/21        Outcome: Progressing Towards Goal By Refugio Cta RN on 09/23/21 6689            Collaborate with patient/family/caregiver/interdisciplinary team to minimize and manage end of life symptoms (Imminent Death)    Disciplines:  Interdisciplinary Expected end:  09/26/21        Outcome: Progressing Towards Goal By Shaye Downs RN on 09/23/21 0350            *Prevention of pressure injury (Pressure Injury - Risk of)    Disciplines:  Interdisciplinary Expected end:  -        Outcome: Progressing Towards Goal By Betty Velazquez on 09/20/21 1744            Patient/Family Education (Patient Education: Go to Patient Education Activity)    Disciplines:  Interdisciplinary Expected end:  09/26/21        Outcome: Progressing Towards Goal By Betty Velazquez on 09/20/21 1744            *Control of Pain (Pain)    Disciplines:  Nurse, Interdisciplinary, RT Expected end:  09/26/21        Outcome: Progressing Towards Goal By Shaye Downs RN on 09/22/21 0352            *PALLIATIVE CARE:  Alleviation of Pain (Pain)    Disciplines:  Nurse, Interdisciplinary, RT Expected end:  09/26/21        Outcome: Progressing Towards Goal By Tez Siddiqui RN on 09/23/21 1953            *Infected Wound: Prevention of further infection and promotion of healing (General Wound Care)    Disciplines:  Interdisciplinary Expected end:  09/29/21        Outcome: Progressing Towards Goal By Tez Siddiqui RN on 09/23/21 1953            Interventions (General Wound Care)    Disciplines:  Interdisciplinary Expected end:  09/26/21        Outcome: Progressing Towards Goal By Tez Siddiqui RN on 09/23/21 1953            Patient/family is receiving emotional support (Emotional Support Needs)    Disciplines:  Interdisciplinary Expected end:  10/01/21        Outcome: Progressing Towards Goal By Hazel Quick LMSW on 09/22/21 1139            Grief heard and acknowledged, anxiety reduced, patient coping identified, patient/family expressed gratitude (Anticipatory Grief)    Disciplines:  Interdisciplinary Expected end:  10/01/21        Outcome: Progressing Towards Goal By Nixon Matos on 09/23/21 1715            Verbalize or staff assess the ability to manage anxiety (Anxiety/Agitation)    Disciplines:  Interdisciplinary Expected end:  09/26/21        Outcome: Progressing Towards Goal By Santy Friend RN on 09/23/21 1958            *Absence of infection signs and symptoms (Infection - Risk of, Urinary Catheter-Associated Urinary Tract Infection)    Disciplines:  Interdisciplinary Expected end:  09/26/21        Outcome: Progressing Towards Goal By Santy Friend RN on 09/23/21 1958                         No Outcome (2)      Patient/Family Education (Patient Education: Go to Patient Education Activity)    Disciplines:  Interdisciplinary Expected end:  -          Patient/Family Education (Patient Education: Go to Patient Education Activity)    Disciplines:  Nurse, Interdisciplinary, RT Expected end:  -                       Resolved/Met (1)      Identify beliefs/practices that support hospice experience (Spiritual Evaluation)    Disciplines:  Interdisciplinary Expected end:  -        Outcome: Resolved/Met By Talia Ybarra on 09/23/21 1714                              ___________________    Care Team Notes          POC/IDG Notes      [facility] ID Nurse Notes by Sona Walters RN at 09/24/21 1458  Version 1 of 1    Author: Sona Walters RN Service: NURSING Author Type: Registered Nurse    Filed: 09/24/21 1458 Date of Service: 09/24/21 1458 Status: Signed    : Sona Walters RN (Registered Nurse)       Patient: Matthew Dunn    Date: 09/24/21  Time: 2:58 PM    [facility] Nurse Notes  1st IDG: Pt is a 42-year-old Female with metabolic encephalopathy who is here GIP level of care for management of pain and wound care. Linn with UOP of 150ml   IV access: None   PO intake: NPO  Oxygen: Room Air  Wounds: Sacral Stage III  PRN medications: Haldol 2mg x 4 doses for agitation / Morphine 2mg x 6 doses for pain / Ativan 1mg x 1 dose   Scheduled meds:  Fentanyl 12mcg patch Q 72 hours  Plan:  Comprehensive plan of care reviewed.  IDG and pt./family in agreement with plan of care. The IDG identifies through on-going assessment when a change is needed to the POC; the pt/family will receive care and services necessitated by changes in POC. Medications reviewed by the pharmacist and Medical Director. Signed by: Yanick Bello RN       [address] [place] [address]  Notes by Belle Sandra at 09/24/21 1246  Version 1 of 1    Author: Belle Sandra Service: Hospice and Palliative Care Author Type:     Filed: 09/24/21 1247 Date of Service: 09/24/21 1246 Status: Signed    : Belle Sandra ()       Pt remains under GIP level of care. No changes in the plan of care. SW will continue to provide ongoing emotional support and assessment of psychosocial and bereavement concerns as well as needs until discharge. [address] [place] [address]  Notes by Rupal Zepeda at 09/24/21 1232  Version 1 of 1    Author: Rupal Zepeda Service: Hospice and Palliative Care Author Type: Pastoral Care    Filed: 09/24/21 1234 Date of Service: 09/24/21 1232 Status: Signed    : Rupal Zepeda (Pastoral Care)       Patient: Elsa Choe    Date: 09/24/21  Time: 12:32 PM    [facility]  Notes    Intervention:  Patient admitted on 9/20/21. Initial spiritual and bereavement assessments completed, and appropriate support has been provided by spiritual care staff. Outcome:   Patient/family receptive to spiritual care. Plan:   Continue to provide spiritual/emotional support as needed, requested, or referred.               Signed by: Cornelia Oneill       [facility] [place] [facility] IDG  Notes by Cindy Birch at 09/22/21 2148  Version 1 of 1    Author: Cindy Birch Service: Spiritual Care Author Type: Pastoral Care    Filed: 09/22/21 2148 Date of Service: 09/22/21 2148 Status: Signed    : Cindy Birch (Pastoral Care)       Patient: Elsa Choe    Date: 09/22/21  Time: 9:48 PM    [facility] [place] [facility]  Notes    / Grief Counselor has reviewed  Initial Comprehensive Assessment and plan of care. Bereavement and Spiritual Care Assessments to be completed and plan of care put in place to meet the needs, requests and referrals. Signed by: Fran Lugo       [facility] IDG  Notes by Arthur Leija LMSW at 09/21/21 0945  Version 1 of 1    Author: Arthur Leija LMSW Service: -- Author Type: Licensed Masters in Social Work    Filed: 09/21/21 0946 Date of Service: 09/21/21 0945 Status: Signed    : Arthur Leija, [address] (Licensed Masters in Social Work)       Patient: Margarita Ruth    Date: 09/21/21  Time: 9:45 AM    [facility] [place] [facility]  Notes  SW has read the initial comprehensive assessment and plan of care. No immediate needs noted. Initial SW assessment visit will be completed within 5 days of admission. Signed by: Cory Ramirez LMSW       [facility] IDG Nurse Notes by Millie Lopez at 09/20/21 1839  Version 1 of 1    Author: Millie Lopez Service: NURSING Author Type: Registered Nurse    Filed: 09/20/21 1840 Date of Service: 09/20/21 1839 Status: Signed    : Millie Lopez (Registered Nurse)       Patient: Margarita Ruth    Date: 09/20/21  Time: 6:39 PM    [facility] [place] [facility] Nurse Notes  58-year-old female arrived to [facility] via [address] with Dx; Metabolic Encephalopathy. Pt is COVID (+)  from test done 8/4 and 9/19. PPE and Droplet Precautions in place. Pt placed on Air Mattress due to immobility and sacral wound. VSS; Temp 98.6   Patient's LOC is GIP, with Symptom management for pain. 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 Pt unable to move on her own and is Total Care. Pt has stage 3 sacral wound, foul smelling with brownish - green drainage and unattached edges with visible bone, measuring 7cm x5cm x 3 cm. Linn placed shortly after arrival, thick yellow urine returned with sediment, pus and strong foul odor . Pt has  #20 peripheral functioning IV in right hand.      Medical History includes, Cervical Spine Stenosis, HTN, CHF, Neuropathic Pain, Dm and Glaucoma. Granddaughter Jett Duncan" is at the bedside, stating \" was up and about, sharp mentally before she got Covid in August then went downhill\". Granddaughter also stating pt lives with her daughter , who is her primary care giver. She didn't have that sore (stage 3 ) until she went into the hospital in august\"    Admission complete and initial General Hospice care plans initiated in addition to symptom-management and LDA. IDG team made aware of plan of care and immediate needs. No active signs or symptoms of pain, shortness of breath , anxiety , seizures or nausea/vomiting upon arrival and presently. Comfort and safety measures in place.            Signed by: Miquel Junior                Care Team Present:

## 2021-09-24 NOTE — [facility] IDG NURSE NOTES
Patient: Juma Rick    Date: 09/24/21  Time: 2:58 PM    [facility] Nurse Notes  1st IDG: Pt is a 77-year-old Female with metabolic encephalopathy who is here GIP level of care for management of pain and wound care. Linn with UOP of 150ml   IV access: None   PO intake: NPO  Oxygen: Room Air  Wounds: Sacral Stage III  PRN medications: Haldol 2mg x 4 doses for agitation / Morphine 2mg x 6 doses for pain / Ativan 1mg x 1 dose   Scheduled meds:  Fentanyl 12mcg patch Q 72 hours  Plan:  Comprehensive plan of care reviewed. IDG and pt./family in agreement with plan of care. The IDG identifies through on-going assessment when a change is needed to the POC; the pt/family will receive care and services necessitated by changes in POC. Medications reviewed by the pharmacist and Medical Director.         Signed by: Keturah Mercado RN

## 2021-09-24 NOTE — PROGRESS NOTES
Problem: Falls - Risk of  Goal: *Absence of Falls  Description: Document Edgar Heiwtt Fall Risk and appropriate interventions in the flowsheet.   9/24/2021 0252 by Yonis Thapa RN  Outcome: Progressing Towards Goal  Note: Fall Risk Interventions:       Mentation Interventions: Adequate sleep, hydration, pain control, Bed/chair exit alarm, Evaluate medications/consider consulting pharmacy, More frequent rounding, Reorient patient, Room close to nurse's station    Medication Interventions: Bed/chair exit alarm, Evaluate medications/consider consulting pharmacy    Elimination Interventions: Bed/chair exit alarm, Call light in reach           9/24/2021 0251 by Yonis Thapa RN  Outcome: Progressing Towards Goal  Note: Fall Risk Interventions:       Mentation Interventions: Adequate sleep, hydration, pain control, Bed/chair exit alarm, Evaluate medications/consider consulting pharmacy, More frequent rounding, Reorient patient, Room close to nurse's station    Medication Interventions: Bed/chair exit alarm, Evaluate medications/consider consulting pharmacy    Elimination Interventions: Bed/chair exit alarm, Call light in reach

## 2021-09-24 NOTE — PROGRESS NOTES
1910 Report received from [address] [place] [address]. Patient observed in bed sleeping quietly, not responding to verbal or tactile stimuli at this time, respirations regular and unlabored, bed in lowest position, call bell in reach, side rails up x2, tab alert in place. 2000 patient remains sleeping quietly, respirations remain regular and unlabored. 2115 Patient remains sleeping quietly, no agitation, FLACC 0, respirations remain regular and unlabored. 2200 noted patient is moaning, grimacing, trying to move around, response to verbal stimuli is moaning. PRN haldol and morphine given SC, R upper arm.    2245 patient sleeping quietly, FLACC 0, no agitation noted, respirations regular and unlabored. 2330 patient sleeping quietly, FLACC 0, respirations regular, unlabored, no agitation noted. 0100 patient sleeping quietly, respirations regular, unlabored, FLACC 0, no agitation noted. 0210 patient sleeping quietly, attempted to check patency of her morton, she did not tolerate me trying to move her legs at all, started to get agitated but was able to see that morton tubing was not kinked and is draining.    0300 patient sleeping quietly, FLACC 0, no agitation noted, respirations regular and unlabored. 1825 patient remains sleeping quietly. 0506 PRN haldol and morphine given SC right leg prior to turning and positioning. Patient is contracted in the LE's, very stiff over all, sacral dsg remains clean, dry and intact, strong noxious odor from wound, note left had is swollen, patient turned to her right side, pillow between the legs, hand elevated on a pillow, she is mumbling incoherently, able to determine that she wanted her head raised but tried to determine what else she was trying to say but I was unable to figure it out. 3063 patient sleeping quietly.     0700 Report given to Malorie Ortiz RN

## 2021-09-24 NOTE — PROGRESS NOTES
7273- The patient report taken from Amy Marie RN. Walking rounds completed. The patient is identified by name and . The patient is GIP with diagnosis of metabolic encephalopathy. The patient has discharge plan of remaining at [facility] until she meets her demise. The discharge plan is reviewed frequently. The patient is resting quietly in the bed with no signs of distress observed. Pain is 0/10 using non verbal scale. No signs of anxiety, SOB or nausea / vomit observed. The bed is low and locked with two bed rails up and the tab alert in place. The patient is on droplet precautions for Covid. The CNA and this RN discussed the plan of care for the day. 0723-The patient is anxious and is in pain during her bath. Pain is at 6/10 scale. The patient was medicated with Lorazepam 1 mg po for agitation and with Morphine 2 mg subcutaneous for pain. Patient was positioned in the bed for comfort. She is on her left side. 0800- The patient is now resting quietly in the bed with pain level of 0/10. No signs of anxiety or SOB or nausea / vomit observed. 1000- Family is in to visit and were updated on the patient's night and the day so far. The family was told that the patient visitation was for two people at a time could visit. The family voiced understanding. 1200- Patient continues to rest quietly in the bed with no signs of distress observed. The family remains at the bedside. 1308- Patient is restless and is grimacing. Medicated with Haldol 2 mg subcutaneous for agitation. 1305-Medicated with Morphine 2 mg for pain of 4/10 and with Haldol 2 mg for anxiety/agitation. Mouth care done. Medications administered subcutaneous. 1330- The patient is resting quietly now with no signs of distress. Pain is at 0/10 using nonverbal. No signs of anxiety, SOB or nausea / vomit. The patient was repositioned in the bed by moving a pillow.      1630- The patient continues to rest with no signs of distress observed. Pain is at 0/10. No signs of anxiety, SOB or nausea/vomit. Mouth care done. Patient has respirations of 14 minute. 1800- Patient continues to rest quietly. Respirations are more shallow now. Reported off to James Cook RN.

## 2021-09-24 NOTE — [facility] IDG SOCIAL WORKER NOTES
Pt remains under GIP level of care. No changes in the plan of care. SW will continue to provide ongoing emotional support and assessment of psychosocial and bereavement concerns as well as needs until discharge.

## 2021-09-24 NOTE — PROGRESS NOTES
Problem: Falls - Risk of  Goal: *Absence of Falls  Description: Document Jason Singletary Fall Risk and appropriate interventions in the flowsheet.   9/24/2021 0252 by Rosario Ramírez RN  Outcome: Progressing Towards Goal  Note: Fall Risk Interventions:       Mentation Interventions: Adequate sleep, hydration, pain control, Bed/chair exit alarm, Evaluate medications/consider consulting pharmacy, More frequent rounding, Reorient patient, Room close to nurse's station    Medication Interventions: Bed/chair exit alarm, Evaluate medications/consider consulting pharmacy    Elimination Interventions: Bed/chair exit alarm, Call light in reach           9/24/2021 0251 by Rosario Ramírez RN  Outcome: Progressing Towards Goal  Note: Fall Risk Interventions:       Mentation Interventions: Adequate sleep, hydration, pain control, Bed/chair exit alarm, Evaluate medications/consider consulting pharmacy, More frequent rounding, Reorient patient, Room close to nurse's station    Medication Interventions: Bed/chair exit alarm, Evaluate medications/consider consulting pharmacy    Elimination Interventions: Bed/chair exit alarm, Call light in reach

## 2021-09-24 NOTE — [facility] IDG CHAPLAIN NOTES
Patient: Hamilton Medel    Date: 09/24/21  Time: 12:32 PM    [facility]  Notes    Intervention:  Patient admitted on 9/20/21. Initial spiritual and bereavement assessments completed, and appropriate support has been provided by spiritual care staff. Outcome:   Patient/family receptive to spiritual care. Plan:   Continue to provide spiritual/emotional support as needed, requested, or referred.               Signed by: Tejas Haile

## 2021-09-25 NOTE — PROGRESS NOTES
6100Marchelsea Yoon received from [address] [place] [address]. Pt name and  identified. Pt in bed, resting with eyes closed. No signs or symptoms of pain, shortness of breath, anxiety , seizures or nausea/vomiting. FLACC 0/10. Comfort and safety measures in place. HOB elevated 30 degrees. Side rails up x2. Bed low and locked. Bed alarm tab in place. Call light in reach of patient, Door closed per PPE protocol. Care Plan reviewed and collaboration done with CNA. Pt expected to pass under GIP however will continue to assess change in LOC, medication & comfort needs, while collaborating with the Interdisciplinary Team.    3331  Pt observed on rounds, sleeping with eyes closed . Respirations even and nonlabored. No facial grimacing noted. No moaning or agitation. Flacc 0/10. No additional symptoms to manage at this time. Comfort and Safety measures maintained. Alarm exit tab in place. 0037: Ativan 1 mg po given per [facility] for restlessness. Great granddaughter at bedside and updated. No facial grimacing or dypsnea noted. FLACC 0/10.     1000: REASESS -Patient resting quietly in bed with eyes closed. Respirations unlabored with no signs or symptoms of distress, pain, agitation, or discomfort noted. FLACC 0.      1052: DRESSING CHANGE: Pt premedicated with Morphine 2 mg SQ. Pt has stage 3 to sacrum. Measures 7 x 5 x 2 cm  with  Unattached edges and tunneling. Strong foul odor coming from wound when pre-existing dressing removed and area measured. Wound cleaned with wound cleanser, packed with Calcium Algenate into wound bed and tunneled areas, covered with 2 ABD pads and 2\" tape. Dressing timed and dated. Pt tolerated fair with some wincing /grimacing of pain. Great-granddaughter at bedside during dressing change , wound witnessed. Pt received full bath per CNA, repositioned onto right side, floated with pillows. 1300: Scheduled Fentanyl patch placed on left upper arm per MAR.  Pt remains restful, respirations even and nonlabored. No facial grimacing noted. FLACC 0/10. No additional sx's of pain, Dyspnea, Agitation or Nausea  to manage at this time. 1500:  Pt observed on rounds, sleeping with eyes closed . Respirations even and nonlabored. No facial grimacing noted. No moaning or agitation. Flacc 0/10. No additional symptoms to manage at this time. Comfort and Safety measures maintained. Alarm exit tab in place. 1700: Patient resting quietly in bed with eyes closed. Doesn't respond to verbal stimuli or touch. Respirations unlabored with no signs or symptoms of distress, pain, agitation, or discomfort noted. FLACC 0. Family members in/out of room to visit. PPE precautions in place, Reminded of 2 Visitor limit. 1803: Pt resting comfortably in bed with eyes closed; no signs of pain or distress noted. RR non labored. No agitation, NVD, or SOB. FLACC 0/10. Family members remain at the bedside in Full PPE.     Report given to Kera Tenorio RN

## 2021-09-25 NOTE — PROGRESS NOTES
900 [facility] received from Renetta Thomas RN. Patient identified by name and . Patient admitted under [facility] level of care with diagnosis of metabolic encephalopathy for management of pain and complex wound care. Patient is lying in bed with eyes closed in no acute distress observed. No current signs or symptoms of pain, anxiety, agitation, dyspnea, or nausea/vomiting. Patient is currently on room air without respiratory distress and remains in Droplet precautions due to positive COVID-19 test. Linn to gravity drain with mitch urine draining. FLAAC 1/10. Patient repositioned for comfort. Reviewed plan of care with CNA for this shift with understanding voiced. Discharge Plan is to remain at [address] until her demise. Reassessment of proper LOC will be done on an ongoing basis. Safety measures in place including, door open for continuous monitoring, bed in low locked position, tab alert in place, call light within reach, and side rails up x2. Will continue to monitor for changes, safety, and needs.  Patient remains lying in bed with no acute distress noted. Respirations remains even and unlabored. She does not show any active signs or pain or discomfort. Her dressing to the sacrum remains clean, dry, and intact. FLACC 0/10. Safety measures remain in place, will continue to monitor for changes, safety, and comfort. 2218 Patient continues to rest without signs/symptoms of pain or discomfort. Respirations remain even and unlabored and no respiratory distress observed. FLACC 0/10. Safety measures remain in place, will continue to monitor for changes, safety, and comfort. 0005 Patient continues to rest quietly and without acute distress. Respirations remain even and unlabored. FLACC 0/10. Safety measures remain in place, will continue to monitor for changes, safety, and comfort. 5876 Patient resting quietly without acute distress noted. Respirations are even and unlabored .  She does not appear to be in any acute distress at this time. FLACC 0/10. Safety measures remain in place, will continue to monitor for changes, safety, and comfort.    0401 Patient continues to rest quietly without acute distress noted. Respirations remain even and unlabored. FLACC 0/10. Safety measures remain in place, will continue to monitor for changes, safety, and comfort. 0470 Patient repositioned and turned with patient beginning to yell and strike out at staff. Patient is unable to be redirected she remains upset and appears to be agitated. Haldol 2mg SC and Morphine 2mg  SC given per prn order for agitation and pain. FLACC 5/10. Safety measures remain in place, will continue to monitor for changes, safety, and comfort. 5189 Patient resting in bed with eyes closed and respirations even and unlabored. FLACC 0/10. Safety measures remain in place, will continue to monitor for changes, safety, and comfort.     Report given to Kierra Meyer RN

## 2021-09-25 NOTE — PROGRESS NOTES
Problem: Falls - Risk of  Goal: *Absence of Falls  Description: Document Georgie Gusman Fall Risk and appropriate interventions in the flowsheet. Outcome: Progressing Towards Goal  Note: Fall Risk Interventions:       Mentation Interventions: Adequate sleep, hydration, pain control, Bed/chair exit alarm, Evaluate medications/consider consulting pharmacy, More frequent rounding, Reorient patient, Room close to nurse's station    Medication Interventions: Bed/chair exit alarm, Evaluate medications/consider consulting pharmacy    Elimination Interventions: Bed/chair exit alarm, Call light in reach              Problem: Pressure Injury - Risk of  Goal: *Prevention of pressure injury  Description: Document Moustapha Scale and appropriate interventions in the flowsheet.   Outcome: Progressing Towards Goal  Note: Pressure Injury Interventions:  Sensory Interventions: Assess changes in LOC, Check visual cues for pain, Keep linens dry and wrinkle-free, Minimize linen layers, Pad between skin to skin, Pressure redistribution bed/mattress (bed type) (air mattress)    Moisture Interventions: Absorbent underpads, Apply protective barrier, creams and emollients, Check for incontinence Q2 hours and as needed, Internal/External urinary devices, Limit adult briefs, Maintain skin hydration (lotion/cream), Minimize layers, Moisture barrier    Activity Interventions: Pressure redistribution bed/mattress(bed type)    Mobility Interventions: HOB 30 degrees or less, Pressure redistribution bed/mattress (bed type)    Nutrition Interventions: Document food/fluid/supplement intake, Offer support with meals,snacks and hydration    Friction and Shear Interventions: Apply protective barrier, creams and emollients, HOB 30 degrees or less, Lift sheet, Minimize layers                Problem: Dyspnea Due to End of Life  Goal: Demonstrate understanding of and ability to manage respiratory symptoms at end of life  Outcome: Progressing Towards Goal Problem: Communication Deficit  Goal: Effectively communicate symptoms, needs, and concerns  Outcome: Progressing Towards Goal     Problem: Pressure Injury - Risk of  Goal: *Prevention of pressure injury  Description: Document Moustapha Scale and appropriate interventions in the flowsheet. Outcome: Progressing Towards Goal  Note: Pressure Injury Interventions:  Sensory Interventions: Assess changes in LOC, Check visual cues for pain, Keep linens dry and wrinkle-free, Minimize linen layers, Pad between skin to skin, Pressure redistribution bed/mattress (bed type) (air mattress)    Moisture Interventions: Absorbent underpads, Apply protective barrier, creams and emollients, Check for incontinence Q2 hours and as needed, Internal/External urinary devices, Limit adult briefs, Maintain skin hydration (lotion/cream), Minimize layers, Moisture barrier    Activity Interventions: Pressure redistribution bed/mattress(bed type)    Mobility Interventions: HOB 30 degrees or less, Pressure redistribution bed/mattress (bed type)    Nutrition Interventions: Document food/fluid/supplement intake, Offer support with meals,snacks and hydration    Friction and Shear Interventions: Apply protective barrier, creams and emollients, HOB 30 degrees or less, Lift sheet, Minimize layers                Problem: Pain  Goal: *Control of Pain  Outcome: Progressing Towards Goal  Goal: *PALLIATIVE CARE:  Alleviation of Pain  Outcome: Progressing Towards Goal     Problem: General Wound Care  Goal: *Infected Wound: Prevention of further infection and promotion of healing  Description: Infection control procedures (eg: clean dressings, clean gloves, hand washing, precautions to isolate wound from contamination, sterile instruments used for wound debridement) should be implemented.   Outcome: Progressing Towards Goal  Goal: Interventions  Outcome: Progressing Towards Goal     Problem: Anxiety/Agitation  Goal: Verbalize or staff assess the ability to manage anxiety  Description: The patient/family/caregiver will verbalize and demonstrate ability to manage the patient's anxiety throughout hospice care.   Outcome: Progressing Towards Goal     Problem: Infection - Risk of, Urinary Catheter-Associated Urinary Tract Infection  Goal: *Absence of infection signs and symptoms  Outcome: Progressing Towards Goal

## 2021-09-25 NOTE — PROGRESS NOTES
Problem: Risk for Spread of Infection  Goal: Prevent transmission of infectious organism to others  Description: Prevent the transmission of infectious organisms to other patients, staff members, and visitors. Outcome: Progressing Towards Goal     Problem: Falls - Risk of  Goal: *Absence of Falls  Description: Document Sampson Keene Fall Risk and appropriate interventions in the flowsheet. Outcome: Progressing Towards Goal  Note: Fall Risk Interventions:  Mobility Interventions: Bed/chair exit alarm    Mentation Interventions: Adequate sleep, hydration, pain control, Bed/chair exit alarm    Medication Interventions: Bed/chair exit alarm    Elimination Interventions: Bed/chair exit alarm, Call light in reach, Toileting schedule/hourly rounds              Problem: Pressure Injury - Risk of  Goal: *Prevention of pressure injury  Description: Document Moustapha Scale and appropriate interventions in the flowsheet.   Outcome: Progressing Towards Goal  Note: Pressure Injury Interventions:  Sensory Interventions: Assess changes in LOC, Float heels, Keep linens dry and wrinkle-free    Moisture Interventions: Absorbent underpads, Apply protective barrier, creams and emollients, Contain wound drainage, Internal/External urinary devices, Limit adult briefs    Activity Interventions: Pressure redistribution bed/mattress(bed type)    Mobility Interventions: Float heels, HOB 30 degrees or less    Nutrition Interventions: Document food/fluid/supplement intake, Offer support with meals,snacks and hydration    Friction and Shear Interventions: Apply protective barrier, creams and emollients, Feet elevated on foot rest, HOB 30 degrees or less, Minimize layers                Problem: Patient Education: Go to Patient Education Activity  Goal: Patient/Family Education  Outcome: Progressing Towards Goal     Problem: Pressure Injury - Risk of  Goal: *Prevention of pressure injury  Description: Document Moustapha Scale and appropriate interventions in the flowsheet. Outcome: Progressing Towards Goal  Note: Pressure Injury Interventions:  Sensory Interventions: Assess changes in LOC, Float heels, Keep linens dry and wrinkle-free    Moisture Interventions: Absorbent underpads, Apply protective barrier, creams and emollients, Contain wound drainage, Internal/External urinary devices, Limit adult briefs    Activity Interventions: Pressure redistribution bed/mattress(bed type)    Mobility Interventions: Float heels, HOB 30 degrees or less    Nutrition Interventions: Document food/fluid/supplement intake, Offer support with meals,snacks and hydration    Friction and Shear Interventions: Apply protective barrier, creams and emollients, Feet elevated on foot rest, HOB 30 degrees or less, Minimize layers                Problem: Dyspnea Due to End of Life  Goal: Demonstrate understanding of and ability to manage respiratory symptoms at end of life  Outcome: Progressing Towards Goal     Problem: Imminent Death  Goal: Collaborate with patient/family/caregiver/interdisciplinary team to minimize and manage end of life symptoms  Outcome: Progressing Towards Goal     Problem: Pain  Goal: *Control of Pain  Outcome: Progressing Towards Goal  Goal: *PALLIATIVE CARE:  Alleviation of Pain  Outcome: Progressing Towards Goal     Problem: Patient Education: Go to Patient Education Activity  Goal: Patient/Family Education  Outcome: Progressing Towards Goal     Problem: General Wound Care  Goal: *Infected Wound: Prevention of further infection and promotion of healing  Description: Infection control procedures (eg: clean dressings, clean gloves, hand washing, precautions to isolate wound from contamination, sterile instruments used for wound debridement) should be implemented.   Outcome: Progressing Towards Goal     Problem: Emotional Support Needs  Goal: Patient/family is receiving emotional support  Description: PtNohemi, and family will receive emotional support weekly from AUBREE through weekly check-ins, education on the hospice philosophy, rapport building, active listening, and validation of feelings throughout pt's hospice journey. Outcome: Progressing Towards Goal     Problem: Anticipatory Grief  Goal: Grief heard and acknowledged, anxiety reduced, patient coping identified, patient/family expressed gratitude  Outcome: Progressing Towards Goal     Problem: Anxiety/Agitation  Goal: Verbalize or staff assess the ability to manage anxiety  Description: The patient/family/caregiver will verbalize and demonstrate ability to manage the patient's anxiety throughout hospice care.   Outcome: Progressing Towards Goal     Problem: Infection - Risk of, Urinary Catheter-Associated Urinary Tract Infection  Goal: *Absence of infection signs and symptoms  Outcome: Progressing Towards Goal

## 2021-09-26 NOTE — PROGRESS NOTES
Problem: Risk for Spread of Infection  Goal: Prevent transmission of infectious organism to others  Description: Prevent the transmission of infectious organisms to other patients, staff members, and visitors. Outcome: Progressing Towards Goal     Problem: Falls - Risk of  Goal: *Absence of Falls  Description: Document [place] Melodyo Fall Risk and appropriate interventions in the flowsheet. Outcome: Progressing Towards Goal  Note: Fall Risk Interventions:  Mobility Interventions: Bed/chair exit alarm    Mentation Interventions: Adequate sleep, hydration, pain control, Bed/chair exit alarm    Medication Interventions: Bed/chair exit alarm    Elimination Interventions: Bed/chair exit alarm, Call light in reach, Toileting schedule/hourly rounds              Problem: Pressure Injury - Risk of  Goal: *Prevention of pressure injury  Description: Document Moustapha Scale and appropriate interventions in the flowsheet.   Outcome: Progressing Towards Goal  Note: Pressure Injury Interventions:  Sensory Interventions: Assess changes in LOC, Float heels, Keep linens dry and wrinkle-free    Moisture Interventions: Absorbent underpads, Apply protective barrier, creams and emollients, Contain wound drainage, Internal/External urinary devices, Limit adult briefs    Activity Interventions: Pressure redistribution bed/mattress(bed type)    Mobility Interventions: Float heels, HOB 30 degrees or less    Nutrition Interventions: Document food/fluid/supplement intake, Offer support with meals,snacks and hydration    Friction and Shear Interventions: Apply protective barrier, creams and emollients, Feet elevated on foot rest, HOB 30 degrees or less, Minimize layers                Problem: Dyspnea Due to End of Life  Goal: Demonstrate understanding of and ability to manage respiratory symptoms at end of life  Outcome: Progressing Towards Goal     Problem: Imminent Death  Goal: Collaborate with patient/family/caregiver/interdisciplinary team to minimize and manage end of life symptoms  Outcome: Progressing Towards Goal     Problem: Pressure Injury - Risk of  Goal: *Prevention of pressure injury  Description: Document Moustapha Scale and appropriate interventions in the flowsheet. Outcome: Progressing Towards Goal  Note: Pressure Injury Interventions:  Sensory Interventions: Assess changes in LOC, Float heels, Keep linens dry and wrinkle-free    Moisture Interventions: Absorbent underpads, Apply protective barrier, creams and emollients, Contain wound drainage, Internal/External urinary devices, Limit adult briefs    Activity Interventions: Pressure redistribution bed/mattress(bed type)    Mobility Interventions: Float heels, HOB 30 degrees or less    Nutrition Interventions: Document food/fluid/supplement intake, Offer support with meals,snacks and hydration    Friction and Shear Interventions: Apply protective barrier, creams and emollients, Feet elevated on foot rest, HOB 30 degrees or less, Minimize layers                Problem: Pain  Goal: *Control of Pain  Outcome: Progressing Towards Goal  Goal: *PALLIATIVE CARE:  Alleviation of Pain  Outcome: Progressing Towards Goal     Problem: General Wound Care  Goal: *Infected Wound: Prevention of further infection and promotion of healing  Description: Infection control procedures (eg: clean dressings, clean gloves, hand washing, precautions to isolate wound from contamination, sterile instruments used for wound debridement) should be implemented. Outcome: Progressing Towards Goal  Goal: Interventions  Outcome: Progressing Towards Goal     Problem: Anxiety/Agitation  Goal: Verbalize or staff assess the ability to manage anxiety  Description: The patient/family/caregiver will verbalize and demonstrate ability to manage the patient's anxiety throughout hospice care.   Outcome: Progressing Towards Goal     Problem: Infection - Risk of, Urinary Catheter-Associated Urinary Tract Infection  Goal: *Absence of infection signs and symptoms  Outcome: Progressing Towards Goal

## 2021-09-26 NOTE — PROGRESS NOTES
Progress Note    Patient: Denny Levine MRN: [id number]  SSN: [id number]    YOB: 1925  Age: 80 y.o. Sex: female      Admit Date: 9/20/2021    LOS: 5 days     Subjective:     Lethargic. Agitated. NPO. Has required morphine x 3 SQ for pain/dyspnea, haldol x 2 and ativan x 1 for agitation. Review of Systems:  Review of systems not obtained due to patient factors. Objective:      09/24/21 1707 09/25/21 0533 09/25/21 1545   BP: (!) 104/56 121/62 (!) 110/59   Pulse: 93 (!) 113 99   Resp: 18 17 16   Temp: (!) 96 °F (35.6 °C) (!) 95.5 °F (35.3 °C) (!) 96 °F (35.6 °C)        Intake and Output:  Current Shift: 09/26 0701 - 09/26 1900  In: -   Out: 200 [Urine:200]  Last three shifts: 09/24 1901 - 09/26 0700  In: -   Out: 200 [Urine:200]    Physical Exam: (Exam completed with input from primary RN due to Covid-19)  GENERAL: fatigued, mild distress, appears older than stated age, toxic, lethargic  LUNG: Coarse diminished breath sounds with shallow respirations. HEART: regular rate and rhythm  ABDOMEN: soft, non-tender. Bowel sounds hypoactive. : Linn catheter with mitch urine. EXTREMITIES:  extremities with no cyanosis. Moderate lower ext edema. + pulses. Bilateral lower extremities with contractures. SKIN: Warm to touch. Stage 3 on the sacrum with dressing intact. NEUROLOGIC: Lethargic. Generalized weakness. Bedbound. PSYCHIATRIC: agitated    Lab/Data Review:  No new labs resulted in the last 24 hours.     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)      Acute midline low back pain without sciatica (9/20/2021)      Pressure injury of sacral region, stage 2 ([address] (9/17/2021)        Plan:     Current Facility-Administered Medications   Medication Dose Route Frequency    haloperidol lactate (HALDOL) injection 1 mg  1 mg SubCUTAneous Q6H    fentaNYL (DURAGESIC) 12 mcg/hr patch 1 Patch  1 Patch TransDERmal Q72H    morphine injection 2 mg  2 mg SubCUTAneous Q20MIN PRN    haloperidol lactate (HALDOL) injection 2 mg  2 mg SubCUTAneous Q1H PRN    acetaminophen (TYLENOL) suppository 650 mg  650 mg Rectal Q3H PRN    LORazepam (ATIVAN) tablet 1 mg  1 mg Oral Q4H PRN    bisacodyL (DULCOLAX) suppository 10 mg  10 mg Rectal PRN    haloperidol lactate (HALDOL) injection 2 mg  2 mg SubCUTAneous Q1H PRN    glycopyrrolate (ROBINUL) injection 0.2 mg  0.2 mg SubCUTAneous Q4H PRN       9/20: Admitted GIP with metabolic encephalopathy for management of pain, dyspnea and agitation.      1. Pain: Morphine 2mg IV/SQ Q20 minutes as needed. Fentanyl 12mcg/hr patch in place.      2. Dyspnea: Morphine 2mg IV/SQ Q20 minutes as needed. Glycopyrrolate 0.2mg q4 prn secretions. Oxygen at 2 L/min prn.     3. Agitation: Haloperidol 2mg IV/SQ Q1 hour prn or Lorazepam 1mg PO/SL q4 hours prn.     4. Family/Pt Support: Medications and plan of care discussed with nursing staff. Will continue to monitor for symptoms and adjust medications as needed to maintain patient comfort. PPS 10%. Case discussed with Dr. Marco Antonio Marrero. Add haldol 1mg SQ Q6 for agitation.      Signed By: Ray Bacon NP     September 25, 2021

## 2021-09-26 NOTE — PROGRESS NOTES
1513- Patient report taken from Akosua Oconnor, EVONNE and walking rounds completed. The patient was identified by name and . The patient is GIP with diagnosis of metabolic encephalopathy. We are treating signs of pain, SOB and anxiety. The discharge plan is for the patient to remain in [facility] with [address] providing care until she meets her demise. The patient is resting quietly. Family is at the bedside. Pain is at 0/10 using non verbal scale. No signs of agitation/ anxiety, SOB or nausea / vomit observed. The patient has two bed rails up and tab alert in place. Family is at the bedside. CNA and RN discussed the plan of care for the shift. 930- The patient had two visitors who spent the night. The visitors were informed that when visiting the visitors were educated that visitors to a Covid patient are not permitted to keep coming in and out. They voiced understanding. Medicated the patient with Lorazepam 1 mg for anxiety with turning and repositioning. Medicated with Morphine 2 mg IV for pain with turning and repositioning. Assisted the CNA to change the patient's gown and top bed linens. Patient had dressing to sacral area with drainage through. Dressing changed per order. See would documentation. Pad under patient changed after her kiah area and back were washed. Cream applied to her feet and legs and kiah area. The patient was positioned to her right side. She tolerated with the medication. Patient was covered with a sheet and prayer shawl. Two family members remain at the bedside. They were asked if they needed anything and requested a drink. Ice and a drink was given to the family members. 1000- Patient is resting quietly in the bed with no signs of distress. Pain is at 0/10. No signs of anxiety, SOB or nausea / vomit observed. 1200- Patient is alone and is resting quietly in fetal position. She was repositioned to the left side. Patient tolerated well. Mouth care done. 1430- Patient continues to rest quietly in the bed. She shows on signs of of pain, 0/10 non verbal scale. No signs of anxiety, SOB or nausea observed. 1727- Patient was medicated for pain of 6/10 during repositioning. Haldol 2 mg for anxiety with repositioning. Medications administered subcutaneously. The patient is on the right side. Patient cannot be positioned to supine of her sacral wound and her legs drawn up. 1800- Spoke with the Julia Fair, a daughter. Educated that all patients with Covid would be limited to 3 visitors per day and that she would pick who wound be on the list. She was educated that each person could come one time each day. Leonard Amezcua wishes to speak with the supervisor, Kaushal Waldron. Will inform Kaushal Waldron. Patient is resting quietly with pain os 0/10, and no signs of SOB, anxiety or N/V.        Reported off  To Pee Mcclain RN

## 2021-09-26 NOTE — PROGRESS NOTES
[address] received from Melany Horowitz RN. Patient identified by name and . Patient admitted under [facility] level of care with diagnosis of metabolic encephalopathy for management of pain and complex wound care. Patient is lying in bed with eyes closed in no acute distress observed. Patient has two family members at her bedside with one being her daughter. No current signs or symptoms of pain, anxiety, agitation, dyspnea, or nausea/vomiting. Patient is currently on room air without respiratory distress and remains on Droplet precautions due to positive COVID-19 result. Linn to gravity drain with mitch urine draining. FLAAC 0/10. Patient repositioned for comfort. Reviewed plan of care with CNA for this shift with understanding voiced. Discharge Plan is to remain at [facility] until her demise. Reassessment of proper LOC will be done on an ongoing basis. Safety measures in place including, door open for continuous monitoring, bed in low locked position, tab alert in place, call light within reach, and side rails up x2. Will continue to monitor for changes, safety, and needs.  Patient's daughter with questions regarding her mother being able to eat. Reinforced education on when it is safe to feed and give liquids to the patient including her being alert and oriented enough to know when to swallow, having her in a high bryan's position, and to take it slowly when giving her anything in her mouth, understanding voiced. Patient is lying in her bed with eyes closed and respirations even and unlabored. She does not appear to be in any acute distress at this time. FLACC 0/10. Safety measures remain in place, will continue to monitor for changes, safety, and comfort. 2225 Patient lying in bed with eyes closed with respirations even and unlabored. Patient does not react to care being provided as she did early this morning. Patient's daughter and grandson remain at her bedside and deny any current needs. FLACC 0/10.  Safety measures remain in place, will continue to monitor for changes, safety, and comfort. 0019 Patient lying in bed with eyes closed in no acute distress noted. Respirations even and unlabored. FLACC 0/10. Safety measures remain in place, will continue to monitor for changes, safety, and comfort. 1379 Patient continues to rest quietly in bed without any acute distress noted. Respirations remain even and unlabored. Family remains at her bedside and deny any current needs. FLACC 0/10. Safety measures remain in place, will continue to monitor for changes, safety, and comfort. 0314 Patient continues to rest quietly without acute distress noted. Respirations remain even and unlabored. Daughter remains at her mothers bedside and denies any current needs. FLACC 0/10. Safety measures remain in place, will continue to monitor for changes, safety, and comfort. 3063 Patient appears to be uncomfortable following being repositioned and changed. Morphine 2mg SC given per prn order for pain. Patient also given her scheduled medications per MD order. Family remains at her bedside and deny any current needs. FLACC 4/10. Safety measures remain in place, will continue to monitor for changes, safety, and comfort. 1772 Patient lying in bed with eyes closed in no acute distress noted. Respirations even and unlabored. FLACC 0/10. Safety measures remain in place, will continue to monitor for changes, safety, and comfort.     BSSR given to Rell Vaughn RN

## 2021-09-27 NOTE — PROGRESS NOTES
AUBREE and Yoana Mckeon met with pt's daughter Haseeb Beth and pt's granddaughter to discuss change in LOC and [address] visitor rules with pt's who are in isolation due to COVID-19. AUBREE and [address] Supervisor discussed room and board charges of $256 per day and discussed a plan about pt going home with hospice services. AUBERE provided pt's granddaughter with SWs e-mail and phone number so any questions can be asked by the family while discussing pt going home. AUBREE will continue to follow up with the family regarding change in LOC and next steps. The family was understanding of the visitation policy and there concerns were addressed.

## 2021-09-27 NOTE — PROGRESS NOTES
Received report from Sarah Pierre RN. Pt Id by name and  during rounds.   Pt lying in bed . Calm. Opens eyes when spoken to. Closes them back again. Non verbal at this time. No facial grim ace. Flacc =0-1. Resp irreg non labored on RA. Lungs diminished. HR reg. BS hypo. Linn cath draining clear mitch urine. Fentanyl 25 mcg/hr patch intact on left chest and dated 21. SR up x 2. Bed low/locked. Call light with in reach. Pt is under Routine level of care. Will continue to assess need for change of level of care. Collaborate with IDG team regarding discharge planning. Reviewed care plan with CNA. 2308  Pt resting with eyes closed. No facial grimace. Flacc =0-1. Resp irreg non labored on RA. SR up x 2. Bed low/locked. Call light with in reach. 0003  Scheduled sq medication given. Pt tolerated well.     0124  Pt with eyes closed. No agitation. No facial grimace or moans. Flacc =0-1. Resp irreg non labored on RA. SR up x 2. Bed low/locked. Call light with in reach. 0325  Pt resting with eyes closed. No facial grimace. Flacc =0-1. Resp irreg non labored on RA. SR up x 2. Bed low/locked. Call light with in reach. 0558  Pt with eyes closed. No facial grimace. Flacc =0-1. Resp irreg non labored on RA. Scheduled haldol 1 mg sq given. Pt tolerated well. SR up x 2. Bed low/locked. Call light with in reach. Report given to Ralph García RN.

## 2021-09-27 NOTE — [facility] IDG SOCIAL WORKER NOTES
Patient: Selvin Rolon    Date: 09/27/21  Time: 11:42 AM    [facility]  Notes  Pt has changed to routine LOC. SW will notify family and discuss next steps. No changes in the plan of care. SW will continue to provide ongoing emotional support and assessment of psychosocial and bereavement concerns, as well as needs until discharge.          Signed by: Keely Maharaj LMSW

## 2021-09-27 NOTE — PROGRESS NOTES
7991 Report received from off going RN. Pt was admitted on 9/20 with hospice dx of metabolic encephalopathy. Pt did not receive any PRN medications overnight. Currently pt is resting in bed with her eyes closed, FLACC 0/10. Safety measures in place with bed low and locked, call bell in reach, tab alert in place, side rails up x2. Door remains closed due to Covid 19 precautions. Plan of care reviewed with CNA. D/C plan- pt will remains at [facility] until her passing, ongoing assessment will continue for appropriate level of care. 1030 Pt resting in bed with FLACC 0/10. CNA at bedside, bath completed. Pt's dressing on sacrum is clean dry and intact. Assessment completed. Fentanyl patch in place on left chest. Pt was repositioned for comfort. Pt did not respond verbally to any care. Safety measures in place. Door remains closed due to Covid 19 precautions. 1125 Phone call returned to pt's granddaughter. Privacy code supplied and update given. Also, granddaughter asked for name and phone number to supervisor to review visitation policy. Information given with [phone] phone number. 1305 Scheduled dose of Haldol given per orders. Pt is resting with her eyes open at times. FLACC 0/10. Safety measures in place. Door remains closed due to Covid 19 precautions. 1400 Family members from visitation list are at the bedside, they state they have a scheduled meeting with Venus Mack today regarding visitation policy. Pt is resting with her eyes closed, FlACC 0/10. Safety measures in place. 1610 Pt is resting in bed with her eyes closed, FLACC 0/10. Safety measures in place. Door remains closed due to Covid 19 precautions. Currently no visitors are at the bedside. 1730 Scheduled dose of Haldol SQ given per orders. Pt is resting with her eyes open. Pt repositioned for comfort onto her left side. Pillows supporting back and between knees. Safety measures in place. Door remains closed due to Covid 19 precautions. Report given to oncoming RN.

## 2021-09-27 NOTE — [facility] IDG CHAPLAIN NOTES
Patient: Claudia Mishra    Date: 09/27/21  Time: 11:32 AM    [facility]  Notes    Intervention: Spiritual Assessment completed. Attempts made to  to communicate with family. Outcome: Patient resting peacefully. Plan: Continue to make an attempt to connect with family.          Signed by: Shahzad Ospina

## 2021-09-27 NOTE — [facility] IDG NURSE NOTES
Patient: Dane Hanson    Date: 09/27/21  Time: 4:51 PM    [facility] Nurse Notes  Follow Up IDG: Pt is a 70-year-old Female with metabolic encephalopathy who is here GIP level of care for management of pain and wound care. Linn with UOP of 200ml   IV access: None   PO intake: NPO  Oxygen: Room Air  Wounds: Sacral Stage III  PRN medications: Morphine 2mg x 2 doses for pain/ Ativan 1mg x 1 dose for agitation   Scheduled meds:  Fentanyl 25mcg Q 72 hours / Haldol 1mg Q 6 hours. Plan: Change to routine LOC 9.27.2021. Add flagyl and anitfungal cream to wound. D/C alginate dressing. Comprehensive plan of care reviewed. IDG and pt./family in agreement with plan of care. The IDG identifies through on-going assessment when a change is needed to the POC; the pt/family will receive care and services necessitated by changes in POC. Medications reviewed by the pharmacist and Medical Director.         Signed by: Mercedes Cabrera RN

## 2021-09-27 NOTE — PROGRESS NOTES
[address] received from Cuate Abel RN. Patient identified by name and . Patient admitted under [facility] level of care with diagnosis of metabolic encephalopathy for management of pain and complex wound care. Patient is lying in bed with eyes closed in no acute distress observed. No current signs or symptoms of pain, anxiety, agitation, dyspnea, or nausea/vomiting. Patient is currently on room air without respiratory distress and remains on Droplet precautions due to positive COVID-19 result. Linn to gravity drain with mitch urine draining. FLAAC 0/10. Patient repositioned for comfort. Reviewed plan of care with CNA for this shift with understanding voiced. Discharge Plan is to remain at [facility] until her demise. Reassessment of proper LOC will be done on an ongoing basis. Safety measures in place including, door open for continuous monitoring, bed in low locked position, tab alert in place, call light within reach, and side rails up x2. Will continue to monitor for changes, safety, and needs. 2042 Patient lying in bed with eyes closed in no acute distress noted. Respirations even and unlabored. FLACC 0/10. Safety measures remain in place, will continue to monitor for changes, safety, and comfort. 2239 Patient continues to rest quietly in bed with eyes closed and respirations even and unlabored. Her body posture appears to be calm and relaxed at this time. FLACC 0/10. Safety measures remain in place, will continue to monitor for changes, safety, and comfort. 5166 Patient lying in bed with eyes closed and respirations even and unlabored. Patient given her scheduled Haldol 1mg SC without incident. She arouses with light tactile stimuli and quickly closes her eyes and appears to go back to resting. FLACC 0/10. Safety measures remain in place, will continue to monitor for changes, safety, and comfort. 0205 Patient lying in bed with eyes closed in no acute distress noted.  Respirations even and unlabored. FLACC 0/10. Safety measures remain in place, will continue to monitor for changes, safety, and comfort. 2910 Patient resting quietly in bed with her eyes closed and respirations even and unlabored. Patient does not appear to be in any acute distress noted. FLACC 0/10. Safety measures remain in place, will continue to monitor for changes, safety, and comfort. 9494 Patient lying in bed with eyes closed in no acute distress noted. Respirations even and unlabored. FLACC 0/10. Safety measures remain in place, will continue to monitor for changes, safety, and comfort.     Report given to Shanta Luke RN

## 2021-09-27 NOTE — PROGRESS NOTES
Progress Note    Patient: Blane Phillips MRN: [id number]  SSN: [id number]    YOB: 1925  Age: 80 y.o. Sex: female      Admit Date: 9/20/2021    LOS: 7 days     Subjective:     Patient is able to take po meds and is able to drink sips of fluids. She is not requiring any prn medications for symptom management. Review of Systems:  Review of systems not obtained due to patient factors. Objective:     Vitals:    09/25/21 0533 09/25/21 1545 09/26/21 1608 09/27/21 0505   BP: 121/62 (!) 110/59 119/66 117/73   Pulse: (!) 113 99 96 97   Resp: 17 16 14 14   Temp: (!) 95.5 °F (35.3 °C) (!) 96 °F (35.6 °C) (!) 96 °F (35.6 °C)         Intake and Output:  Current Shift: No intake/output data recorded. Last three shifts: 09/25 1901 - 09/27 0700  In: -   Out: 200 [Urine:200]      Lab/Data Review:  No new labs resulted in the last 24 hours. 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)      Acute midline low back pain without sciatica (9/20/2021)      Pressure injury of sacral region, stage 2 ([address] (9/17/2021)        Plan:     Current Facility-Administered Medications   Medication Dose Route Frequency    fentaNYL (DURAGESIC) 25 mcg/hr patch 1 Patch  1 Patch TransDERmal Q72H    haloperidol lactate (HALDOL) injection 1 mg  1 mg SubCUTAneous Q6H    morphine injection 2 mg  2 mg SubCUTAneous Q20MIN PRN    haloperidol lactate (HALDOL) injection 2 mg  2 mg SubCUTAneous Q1H PRN    acetaminophen (TYLENOL) suppository 650 mg  650 mg Rectal Q3H PRN    LORazepam (ATIVAN) tablet 1 mg  1 mg Oral Q4H PRN    bisacodyL (DULCOLAX) suppository 10 mg  10 mg Rectal PRN    haloperidol lactate (HALDOL) injection 2 mg  2 mg SubCUTAneous Q1H PRN    glycopyrrolate (ROBINUL) injection 0.2 mg  0.2 mg SubCUTAneous Q4H PRN       Change to routine level of care. Symptoms are managed with current medication regimen.     Signed By: Geovany Jerez Laura Moya, ITZ     September 27, 2021

## 2021-09-27 NOTE — PROGRESS NOTES
Problem: Risk for Spread of Infection  Goal: Prevent transmission of infectious organism to others  Description: Prevent the transmission of infectious organisms to other patients, staff members, and visitors. Outcome: Progressing Towards Goal     Problem: Falls - Risk of  Goal: *Absence of Falls  Description: Document Jerry Mercer Fall Risk and appropriate interventions in the flowsheet. Outcome: Progressing Towards Goal  Note: Fall Risk Interventions:  Mobility Interventions: Bed/chair exit alarm    Mentation Interventions: Adequate sleep, hydration, pain control, Bed/chair exit alarm    Medication Interventions: Bed/chair exit alarm    Elimination Interventions: Bed/chair exit alarm, Call light in reach, Toileting schedule/hourly rounds              Problem: Pressure Injury - Risk of  Goal: *Prevention of pressure injury  Description: Document Moustapha Scale and appropriate interventions in the flowsheet.   Outcome: Progressing Towards Goal  Note: Pressure Injury Interventions:  Sensory Interventions: Assess changes in LOC, Float heels, Keep linens dry and wrinkle-free    Moisture Interventions: Absorbent underpads, Apply protective barrier, creams and emollients, Contain wound drainage, Internal/External urinary devices, Limit adult briefs    Activity Interventions: Pressure redistribution bed/mattress(bed type)    Mobility Interventions: Float heels, HOB 30 degrees or less    Nutrition Interventions: Document food/fluid/supplement intake, Offer support with meals,snacks and hydration    Friction and Shear Interventions: Apply protective barrier, creams and emollients, Feet elevated on foot rest, HOB 30 degrees or less, Minimize layers                Problem: Dyspnea Due to End of Life  Goal: Demonstrate understanding of and ability to manage respiratory symptoms at end of life  Outcome: Progressing Towards Goal     Problem: Communication Deficit  Goal: Effectively communicate symptoms, needs, and concerns  Outcome: Progressing Towards Goal     Problem: Pain  Goal: *Control of Pain  Outcome: Progressing Towards Goal  Goal: *PALLIATIVE CARE:  Alleviation of Pain  Outcome: Progressing Towards Goal     Problem: General Wound Care  Goal: *Infected Wound: Prevention of further infection and promotion of healing  Description: Infection control procedures (eg: clean dressings, clean gloves, hand washing, precautions to isolate wound from contamination, sterile instruments used for wound debridement) should be implemented. Outcome: Progressing Towards Goal  Goal: Interventions  Outcome: Progressing Towards Goal     Problem: Anxiety/Agitation  Goal: Verbalize or staff assess the ability to manage anxiety  Description: The patient/family/caregiver will verbalize and demonstrate ability to manage the patient's anxiety throughout hospice care.   Outcome: Progressing Towards Goal     Problem: Infection - Risk of, Urinary Catheter-Associated Urinary Tract Infection  Goal: *Absence of infection signs and symptoms  Outcome: Progressing Towards Goal

## 2021-09-27 NOTE — PROGRESS NOTES
Problem: Risk for Spread of Infection  Goal: Prevent transmission of infectious organism to others  Description: Prevent the transmission of infectious organisms to other patients, staff members, and visitors. Outcome: Progressing Towards Goal   Droplet precautions will be followed by staff and visitors  Problem: Falls - Risk of  Goal: *Absence of Falls  Description: Document Saba High Fall Risk and appropriate interventions in the flowsheet. Note: Fall Risk Interventions:  Mobility Interventions: Bed/chair exit alarm    Mentation Interventions: Adequate sleep, hydration, pain control, Bed/chair exit alarm    Medication Interventions: Bed/chair exit alarm    Elimination Interventions: Bed/chair exit alarm, Call light in reach, Toileting schedule/hourly rounds  Ms. Aguila Chacon will remain fall free during her hospice stay  Problem: Pressure Injury - Risk of  Goal: *Prevention of pressure injury  Description: Document Moustapha Scale and appropriate interventions in the flowsheet.   Note: Pressure Injury Interventions:  Sensory Interventions: Assess changes in LOC, Float heels, Keep linens dry and wrinkle-free    Moisture Interventions: Absorbent underpads, Apply protective barrier, creams and emollients, Contain wound drainage, Internal/External urinary devices, Limit adult briefs    Activity Interventions: Pressure redistribution bed/mattress(bed type)    Mobility Interventions: Float heels, HOB 30 degrees or less    Nutrition Interventions: Document food/fluid/supplement intake, Offer support with meals,snacks and hydration    Friction and Shear Interventions: Apply protective barrier, creams and emollients, Feet elevated on foot rest, HOB 30 degrees or less, Minimize layers     Pressure injury will not get worse during stay in hospice

## 2021-09-28 NOTE — PROGRESS NOTES
Problem: Risk for Spread of Infection  Goal: Prevent transmission of infectious organism to others  Description: Prevent the transmission of infectious organisms to other patients, staff members, and visitors. Outcome: Progressing Towards Goal     Problem: Falls - Risk of  Goal: *Absence of Falls  Description: Document Miladis Brit Fall Risk and appropriate interventions in the flowsheet. Outcome: Progressing Towards Goal  Note: Fall Risk Interventions:  Mobility Interventions: Bed/chair exit alarm    Mentation Interventions: Adequate sleep, hydration, pain control, Bed/chair exit alarm, Reorient patient    Medication Interventions: Bed/chair exit alarm, Evaluate medications/consider consulting pharmacy    Elimination Interventions: Bed/chair exit alarm, Call light in reach              Problem: Pressure Injury - Risk of  Goal: *Prevention of pressure injury  Description: Document Moustapha Scale and appropriate interventions in the flowsheet. Outcome: Progressing Towards Goal  Note: Pressure Injury Interventions:  Sensory Interventions: Assess changes in LOC, Float heels, Minimize linen layers, Keep linens dry and wrinkle-free, Check visual cues for pain, Pressure redistribution bed/mattress (bed type), Pad between skin to skin    Moisture Interventions: Absorbent underpads, Internal/External urinary devices, Minimize layers, Moisture barrier, Limit adult briefs, Apply protective barrier, creams and emollients    Activity Interventions: Pressure redistribution bed/mattress(bed type)    Mobility Interventions: HOB 30 degrees or less, Float heels    Nutrition Interventions: Document food/fluid/supplement intake    Friction and Shear Interventions: Apply protective barrier, creams and emollients, Minimize layers, Lift sheet                Problem: Pressure Injury - Risk of  Goal: *Prevention of pressure injury  Description: Document Moustapha Scale and appropriate interventions in the flowsheet.   Outcome: Progressing Towards Goal  Note: Pressure Injury Interventions:  Sensory Interventions: Assess changes in LOC, Float heels, Minimize linen layers, Keep linens dry and wrinkle-free, Check visual cues for pain, Pressure redistribution bed/mattress (bed type), Pad between skin to skin    Moisture Interventions: Absorbent underpads, Internal/External urinary devices, Minimize layers, Moisture barrier, Limit adult briefs, Apply protective barrier, creams and emollients    Activity Interventions: Pressure redistribution bed/mattress(bed type)    Mobility Interventions: HOB 30 degrees or less, Float heels    Nutrition Interventions: Document food/fluid/supplement intake    Friction and Shear Interventions: Apply protective barrier, creams and emollients, Minimize layers, Lift sheet                Problem: Dyspnea Due to End of Life  Goal: Demonstrate understanding of and ability to manage respiratory symptoms at end of life  Outcome: Progressing Towards Goal     Problem: Pain  Goal: *Control of Pain  Outcome: Progressing Towards Goal  Goal: *PALLIATIVE CARE:  Alleviation of Pain  Outcome: Progressing Towards Goal     Problem: General Wound Care  Goal: Interventions  Outcome: Progressing Towards Goal     Problem: Anxiety/Agitation  Goal: Verbalize or staff assess the ability to manage anxiety  Description: The patient/family/caregiver will verbalize and demonstrate ability to manage the patient's anxiety throughout hospice care.   Outcome: Progressing Towards Goal     Problem: Infection - Risk of, Urinary Catheter-Associated Urinary Tract Infection  Goal: *Absence of infection signs and symptoms  Outcome: Progressing Towards Goal

## 2021-09-28 NOTE — PROGRESS NOTES
AUBREE spoke with pt's granddaughter Valentina Pérez about the plan of discharge for pt. Valentina Pérez reports that the family has come together and will provide support for pt in the home and that they are prepare for pt to return home tomorrow. AUBREE and [facility] NP Chrissie Chávez have discussed a plan based on making sure that pt can tolerate sublingual medications and will discuss again in the morning before calling for transport for pt. AUBREE discussed this plan with Jessica and she voiced understanding. [facility] nurses will educate the family on dressing changes and how to give sublingual medications. AUBREE will connect with Valentina Pérez again tomorrow morning.

## 2021-09-28 NOTE — PROGRESS NOTES
Problem: Risk for Spread of Infection  Goal: Prevent transmission of infectious organism to others  Description: Prevent the transmission of infectious organisms to other patients, staff members, and visitors. Outcome: Progressing Towards Goal AEB isolation precautions and appropriate use of PPE for staff and visitors. Problem: Patient Education:  Go to Education Activity  Goal: Patient/Family Education  Outcome: Progressing Towards Goal     Problem: Falls - Risk of  Goal: *Absence of Falls  Description: Document Sofiya Ano Fall Risk and appropriate interventions in the flowsheet. Outcome: Progressing Towards Goal AEB no falls or near falls. Note: Fall Risk Interventions:  Mobility Interventions: Bed/chair exit alarm    Mentation Interventions: Adequate sleep, hydration, pain control, Bed/chair exit alarm, Reorient patient    Medication Interventions: Bed/chair exit alarm, Evaluate medications/consider consulting pharmacy    Elimination Interventions: Bed/chair exit alarm, Call light in reach         Problem: Pressure Injury - Risk of  Goal: *Prevention of pressure injury  Description: Document Moustapha Scale and appropriate interventions in the flowsheet. Outcome: Progressing Towards Goal AEB no NEW pressure injury.    Note: Pressure Injury Interventions:  Sensory Interventions: Assess changes in LOC, Float heels, Minimize linen layers, Keep linens dry and wrinkle-free, Check visual cues for pain, Pressure redistribution bed/mattress (bed type), Pad between skin to skin    Moisture Interventions: Absorbent underpads, Internal/External urinary devices, Minimize layers, Moisture barrier, Limit adult briefs, Apply protective barrier, creams and emollients    Activity Interventions: Pressure redistribution bed/mattress(bed type)    Mobility Interventions: HOB 30 degrees or less, Float heels    Nutrition Interventions: Document food/fluid/supplement intake    Friction and Shear Interventions: Apply protective barrier, creams and emollients, Minimize layers, Lift sheet       Problem: Dyspnea Due to End of Life  Goal: Demonstrate understanding of and ability to manage respiratory symptoms at end of life  Outcome: Progressing Towards Goal AEB no signs of dyspnea or irregular breathing patterns. Problem: Pain  Goal: *Control of Pain  Outcome: Progressing Towards Goal AEB no non verbal cues of pain. Problem: Anxiety/Agitation  Goal: Verbalize or staff assess the ability to manage anxiety  Description: The patient/family/caregiver will verbalize and demonstrate ability to manage the patient's anxiety throughout hospice care. Outcome: Progressing Towards Goal AEB no restless or agitated behaviors. Tolerating scheduled SL haldol.       Problem: Infection - Risk of, Urinary Catheter-Associated Urinary Tract Infection  Goal: *Absence of infection signs and symptoms  Outcome: Progressing Towards Goal

## 2021-09-28 NOTE — PROGRESS NOTES
[facility] Patient is under Routine level of care with primary dx Metabolic Encephalopathy. Found resting comfortably with eyes closed. No signs of restlessness, pain, or dyspnea. RR 12, even and unlabored. Safety measures include bed alarm, bed in l/l position, SR X3. Patient remains under isolation precautions for COVID with door closed. CNA verbalized understanding of today's POC. 1050-assisted with bed bath and oral care. Pt alert and makes eye contact. Attempts to speak but unable to make needs known; unable to follow commands. Resting comfortably with no dyspnea, pain or agitation observed. FLACC 0. repositioned for comfort. Sacral wound is covered by dry, intact dressing. Dressing due to be changed and measured on tomorrow's shift. Fentanyl patch noted to left upper chest. Linn draining. Repositioned for comfort. Safety measures in place. 1244-scheduled Haldol PO given SL; pt tolerated well with no coughing. alert to voice, makes eye contact. Shakes head no when asked about pain and if she has needs. FLACC 0. Appears comfortable with no dyspnea, pain, or restless behavior. 1640-resting on left side with eyes closed. No restless behaviors, lying quiet with no movement. RR 15, even and unlabored. FLACC 0. All safety measures remain in place. 1835-scheduled Haldol given SL, tolerates PO. Given water and drank half cup water with straw with no coughing or choking. Safety measures in place. No agitation, dyspnea, or pain observed. Report given to oncoming RN.

## 2021-09-29 NOTE — PROGRESS NOTES
1930 Received report from off going RN  Identified pt by name and date of birth. GIP level of care admitted for hospice dx of metabolic encephalopathy and Covid 19 to manage pain and complex wound care    Pt plans to remain at [facility] until passing. [facility] will be reviewed often for signs of change in [address]   Plan of Care was reviewed.     Safety measures such as tab alert,  bed in low/locked position ,and  side rails x 3, are in place.  Linn draining yellow urine.   Pt is resting with eyes closed  No signs of distress noted. FLACC 0/10     2330  Pt resting with eyes closed  No signs of distress noted. Breathing even and regular. FLACC 0/10  All safety measures in place. 0400  Pt resting with eyes closed. No signs of pain noted.   FLACC 0/10

## 2021-09-29 NOTE — PROGRESS NOTES
0730- Report received, patient resting quietly in bed with no s/sx pain, dyspnea, agitation, or distress. Call light within reach. Bed is low and locked, sr up x 3, tab alert in place, and door left closed as patient is on Droplet Plus Precautions. Patient is currently under routine level of care. Will continue to assess need for change in LOC / discharge and collaborate with IDG team accordingly. Care plan reviewed with CNA. 0830- patient continues to rest. No s/sx of pain or distress. 8130- prn morphine 5 mg SL given prior to wound care. Patient swallowed medicine. 1015- wound care provided to sacrum per orders. Would with very foul odor. Wound bed with slough and brown matter. Excessive drainage to prior dressing. Assistance from [place], [facility]. Partial bath given and patient turned to L side. Patient started to yell and cry unintelligible speech. 1032- prn haldol given for agitation. Patient able to swallow medication. Speech became more clear and pt heard asking for \"water! Water! \" over and over again. Patient given sips of water via straw and tolerated well. She drank about 120 cc and stopped wailing. 1115- no s/sx of pain or distress. Eyes closed. flacc 0/10.    1315- Darlene Griffith, Granddaughter to desk for d/c report. Education given on medications, wound care, morton care, turning, eating/drinking. Turned patient in room with granddaughter and showed her how to clean morton and empty bag. Granddaughter expressed understanding. Scheduled haldol given at this time. Patient took sips of water. Some coughing noted with water. Patient calm at this time. 1410- no s/sx of pain or distress. 1514-  and EMS here. Prn morphine given prior to transport followed by sip of water. Rapid covid test and prc test given to [place]. 1530- patient d/c home. 1630- report given EVONNE Massey    4708- pt positive for covid per lab report.  Results called to EVONNE Herrera

## 2021-09-29 NOTE — PROGRESS NOTES
SW set up transport, spoke with Arun Smiley from [facility], and and got equipment delivered for pt to return home today. AUBREE spoke with pt's granddaughter Isis Ribera and told her the time for transport. AUBREE provided emotional support to Isis Ribera when [place] [facility] [place] talking with Rosendo Lu.

## 2021-09-29 NOTE — PROGRESS NOTES
Problem: Risk for Spread of Infection  Goal: Prevent transmission of infectious organism to others  Description: Prevent the transmission of infectious organisms to other patients, staff members, and visitors. Outcome: Progressing Towards Goal     Problem: Patient Education:  Go to Education Activity  Goal: Patient/Family Education  Outcome: Progressing Towards Goal     Problem: Falls - Risk of  Goal: *Absence of Falls  Description: Document Wallace Fall Risk and appropriate interventions in the flowsheet. Outcome: Progressing Towards Goal  Note: Fall Risk Interventions:  Mobility Interventions: Bed/chair exit alarm    Mentation Interventions: Adequate sleep, hydration, pain control, Bed/chair exit alarm, Reorient patient    Medication Interventions: Bed/chair exit alarm, Evaluate medications/consider consulting pharmacy    Elimination Interventions: Bed/chair exit alarm, Call light in reach              Problem: Pressure Injury - Risk of  Goal: *Prevention of pressure injury  Description: Document Moustapha Scale and appropriate interventions in the flowsheet.   Outcome: Progressing Towards Goal  Note: Pressure Injury Interventions:  Sensory Interventions: Assess changes in LOC, Float heels, Minimize linen layers, Keep linens dry and wrinkle-free, Check visual cues for pain, Pressure redistribution bed/mattress (bed type), Pad between skin to skin    Moisture Interventions: Absorbent underpads, Internal/External urinary devices, Minimize layers, Moisture barrier, Limit adult briefs, Apply protective barrier, creams and emollients    Activity Interventions: Pressure redistribution bed/mattress(bed type)    Mobility Interventions: HOB 30 degrees or less, Float heels    Nutrition Interventions: Document food/fluid/supplement intake    Friction and Shear Interventions: Apply protective barrier, creams and emollients, Minimize layers, Lift sheet                Problem: Dyspnea Due to End of Life  Goal: Demonstrate understanding of and ability to manage respiratory symptoms at end of life  Outcome: Progressing Towards Goal     Problem: Communication Deficit  Goal: Effectively communicate symptoms, needs, and concerns  Outcome: Progressing Towards Goal     Problem: Imminent Death  Goal: Collaborate with patient/family/caregiver/interdisciplinary team to minimize and manage end of life symptoms  Outcome: Progressing Towards Goal     Problem: Pain  Goal: *Control of Pain  Outcome: Progressing Towards Goal  Goal: *PALLIATIVE CARE:  Alleviation of Pain  Outcome: Progressing Towards Goal     Problem: General Wound Care  Goal: *Infected Wound: Prevention of further infection and promotion of healing  Description: Infection control procedures (eg: clean dressings, clean gloves, hand washing, precautions to isolate wound from contamination, sterile instruments used for wound debridement) should be implemented. Outcome: Progressing Towards Goal     Problem: Anxiety/Agitation  Goal: Verbalize or staff assess the ability to manage anxiety  Description: The patient/family/caregiver will verbalize and demonstrate ability to manage the patient's anxiety throughout hospice care.   Outcome: Progressing Towards Goal     Problem: Infection - Risk of, Urinary Catheter-Associated Urinary Tract Infection  Goal: *Absence of infection signs and symptoms  Outcome: Progressing Towards Goal

## 2021-09-29 NOTE — DISCHARGE SUMMARY
Discharge Summary     Patient: Georgina Degroot MRN: [id number]  SSN: [id number]    YOB: 1925  Age: 80 y.o. 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  ICD-9-CM: 139.8 Chronic 9/20/2021 - Present        Acute midline low back pain without sciatica ICD-10-CM: M54.5  ICD-9-CM: 724.2 Present on Admission 9/20/2021 - Present        * (Principal) Metabolic encephalopathy SGR-[id number]: G93.41  ICD-9-CM: 348.31  9/20/2021 - Present        Failure to thrive in adult ICD-10-CM: R62.7  ICD-9-CM: 783.7  9/17/2021 - Present        Pressure injury of sacral region, stage 2 ([facility] [address] ICD-10-CM: S04.238  ICD-9-CM: 707.03, 707.22  9/17/2021 - Present        COVID-19 virus infection ICD-10-CM: U07.1  ICD-9-CM: 079.89  8/5/2021 - Present        Hypomagnesemia ICD-10-CM: E83.42  ICD-9-CM: 275.2  8/5/2021 - Present        Pneumonia of both lungs due to infectious organism ICD-10-CM: J18.9  ICD-9-CM: 483.8  8/5/2021 - Present        Hyponatremia ICD-10-CM: E87.1  ICD-9-CM: 276.1  8/4/2021 - Present        Debility ICD-10-CM: R53.81  ICD-9-CM: 799.3  3/28/2019 - Present        Anemia ICD-10-CM: D64.9  ICD-9-CM: 285.9  8/18/2017 - Present        Cervical stenosis of spinal canal ICD-10-CM: M48.02  ICD-9-CM: 723.0  8/18/2017 - Present        Oropharyngeal dysphagia ICD-10-CM: R13.12  ICD-9-CM: 787.22  8/18/2017 - Present               Discharge Condition: DENI [address] Course: Discharge from: Routine stay at [facility] to home with [facility]. Discharge 09/29/21 at 1500 via ambulance. Status at time of discharge is routine.      Consults: None    Significant Diagnostic Studies: None    Disposition: home with [facility]    Discharge Medications:   Current Discharge Medication List      START taking these medications    Details   acetaminophen (TYLENOL) 650 mg suppository Insert 1 Suppository into rectum every three (3) hours as needed for Fever. Qty: 2 Suppository, Refills: 0      fentaNYL (DURAGESIC) 25 mcg/hr PATCH 1 Patch by TransDERmal route every seventy-two (72) hours for 30 days. Max Daily Amount: 1 Patch. Qty: 5 Patch, Refills: 0    Associated Diagnoses: Chronic low back pain without sciatica, unspecified back pain laterality, cervical stenosis      !! haloperidol (HALDOL) 2 mg/mL oral concentrate Take 1 mL by mouth every six (6) hours. Qty: 30 mL, Refills: 0    Comments: May give under the tongue. !! haloperidol (HALDOL) 2 mg/mL oral concentrate Take 1 mL by mouth or under the tongue every four (4) hours as needed for Agitation (or nausea). Qty: 30 mL, Refills: 0      hyoscyamine SL (LEVSIN/SL) 0.125 mg SL tablet 1 Tablet by SubLINGual route every four (4) hours as needed for PRN Indication (Other) (secretions). Qty: 10 Tablet, Refills: 0      LORazepam (ATIVAN) 1 mg tablet 1 Tablet by SubLINGual route every four (4) hours as needed for Anxiety. Max Daily Amount: 6 mg. Qty: 20 Tablet, Refills: 0    Associated Diagnoses: Anxiety      !! metroNIDAZOLE (FLAGYL) 500 mg tablet Take 1 Tablet by mouth every three (3) days. Qty: 10 Tablet, Refills: 0    Comments: Wound Stage III - Cleanse wound location with wound cleanser, pat dry. Apply metronidazole 500 mg crushed daily mixed with 30 mL anti-fungal cream applied to wound bed. Apply gauze, abd pad and secure with kerlix. Place chux pad under wound. Change every 3 days and PRN if soiled. !! miconazole (MICOTIN) 2 % topical cream Apply 1 Each to affected area every three (3) days. Qty: 15 g, Refills: 0      !! metroNIDAZOLE (FLAGYL) 500 mg tablet Take 1 Tablet by mouth as needed for PRN Indication (Other) (wound care).   Qty: 5 Tablet, Refills: 0    Comments: Wound Stage III - Cleanse wound location with wound cleanser, pat dry. Apply metronidazole 500 mg crushed daily mixed with 30 mL anti-fungal cream applied to wound bed. Apply gauze, abd pad and secure with kerlix. Place chux pad under wound. Change every 3 days and PRN if soiled. !! miconazole (MICOTIN) 2 % topical cream Apply  to affected area as needed for PRN Indication (Other) (wound care). Qty: 15 g, Refills: 0      morphine (ROXANOL) 100 mg/5 mL (20 mg/mL) concentrated solution Take 5mg=0.25 mL by mouth every two (2) hours as needed for Pain or Shortness of Breath   Qty: 30 mL, Refills: 0    Associated Diagnoses: Chronic low back pain without sciatica, unspecified back pain laterality, cervical stenosis       ! ! - Potential duplicate medications found. Please discuss with provider. CONTINUE these medications which have CHANGED    Details   bisacodyL (DULCOLAX) 10 mg supp Insert 10 mg into rectum daily as needed for Constipation. Qty: 2 Suppository, Refills: 0         STOP taking these medications       benzonatate (TESSALON) 100 mg capsule Comments:   Reason for Stopping:         amLODIPine (NORVASC) 10 mg tablet Comments:   Reason for Stopping:         hydrALAZINE (APRESOLINE) 50 mg tablet Comments:   Reason for Stopping:         acetaminophen (TYLENOL) 500 mg tablet Comments:   Reason for Stopping:         carvediloL (COREG) 25 mg tablet Comments:   Reason for Stopping:         dexAMETHasone (DECADRON) 6 mg tablet Comments:   Reason for Stopping:         famotidine (PEPCID) 20 mg tablet Comments:   Reason for Stopping:         gabapentin (NEURONTIN) 300 mg capsule Comments:   Reason for Stopping:         Purelax 17 gram packet Comments:   Reason for Stopping:         traMADoL (ULTRAM) 50 mg tablet Comments:   Reason for Stopping:               Activity: Bedrest  Diet: Diet as tolerated  Wound Care: Routine catheter care, inserted 9/20/21. Sacral Wound Stage III - Cleanse wound location with wound cleanser, pat dry.  Apply metronidazole 500 mg crushed daily mixed with 30 mL anti-fungal cream applied to wound bed. Apply gauze, abd pad and secure with kerlix. Place chux pad under wound. Change every 3 days and PRN if soiled. Oxygen: Oxygen at 2 L/min via NC as needed for shortness of breath  Equipment: Equipment will be delivered prior to discharge:, Hospital bed, Bedside table, oxygen concentrator and air mattress    Follow-up Appointments   Procedures    FOLLOW UP VISIT Appointment in: Other (Specify) Follow-up with home hospice per agency protocol. Follow-up with home hospice per agency protocol. Standing Status:   Standing     Number of Occurrences:   1     Order Specific Question:   Appointment in     Answer:    Other (Specify)       Signed By: Dalia George NP     September 29, 2021

## 2021-09-29 NOTE — DISCHARGE INSTRUCTIONS
Discharge Condition: Fair     Code Status:DNR     Hospital Course: Discharge from: Routine stay at [facility] to home with [facility]. Discharge 09/29/21 at 1500 via ambulance.  Status at time of discharge is routine.      Consults: None     Significant Diagnostic Studies: None     Disposition: home with [facility]

## 2021-09-30 NOTE — HOSPICE
pt arrived home from [facility] this afternoon. Covid re testing was done prior to discharge and she tested positive. FULL PPE precautions followed. Caregivers instructed to always wear gloves, masks when handling pt and to wash hands before and after. She is bedbound and extremely lethargic, alert to her name , nodded her head to questions. she could barely open her eyes, opened her mouth as if to say she wanted some water. Granddaughter offered her water through straw, and she could hardly take a sip. water given with spoon and she took some well. she is not able to eat due to extreme lethargy. she appeared calm, was not agitated, breathing normal on room air. lungs clear to auscultation. her body is on the cold side. Inform family that she is transitioning and family were saddened. large sacral decubitus, with foul odor and drainage. Dressing was done at [facility] today prior to discharge. Dressing removed, wound cleaned with wound spray, and covered with  dry gauze and abd pad and taped. Flagyl tab for wound treatment not in stock at [facility] and order placed with [facility] with other meds for delivery tomorrow. Morphine conc, Fentanyl patch 1 box,  haldol liquid, ativan tabs,  tylenol supp and dulcolax supp obtained at local [facility]. Pt has very large family, explained to family members who will be providing care for her to be mindful of medications. she has adequate dressing supplies for now. Pt was resituated in bed and appeared comfortable. BP 90/70, HR 76 reg, R 16.

## 2021-09-30 NOTE — HOSPICE
daily nurse visits due to pt decline and family support. 24 hr  post leaving [facility] visit. pt is lying in hospital bed unresponsive. Pt is bedbound. Pts dtr is present. Pt has a sacral wound that was cleansed & redressed today by rn. pt tolerated procedure well. Pt has no Sob today. Pt wears o2 at  2  liters prn. Pt has no edema. Pts Appetite is none. Pts cg denies any pt Nausea / vomiting. Pts Urine is medium yellow and clear in catheter bag. Pt has no s&s of pain today. pts dtr is giving pt roxanol prn for s&s of distress. pt appears calm with s&s controlled. Pt takes his meds as liquid only. Sn reviewed pts meds. Refilled meds: none needed  Ordered supplies: mouth swabs, chux, urinal, bath basin, abd pads & wipes. SN updated mar & reconciled meds. sn updated care plan. Instructed pts cg to call [facility] with any questions or concerns. Pts cg verbalized understanding and agreement. sn will see pt again tomorrow.

## 2021-10-02 NOTE — HOSPICE
Visit for patient decline-transitional  FLACC score 0  LC decreased on RA  Morton draining 500cc clear tea colored  CG completed morton care and wound care this am.  Disimpacted of large charles stool-tolerated well  CG changed fentayl patch today  Patient responds with nods to CG questions  Educated CG on dying process and care  CG very comfortable and competent in her care.    Mouth care only with no PO for days per CG

## 2021-10-02 NOTE — HOSPICE
daily nurse visits due to pt decline and family support. pt is lying in hospital bed unresponsive. Pt is bedbound. Pts dtr is present. Pt has a sacral wound that was cleansed & redressed today by rn. pt tolerated procedure well. Pt has no Sob today. Pt wears o2 at 2 liters prn. Pt has no edema. Pts Appetite is none. Pts cg denies any pt Nausea / vomiting. Pts Urine is medium yellow and clear in catheter bag. Pt has no s&s of pain today. pts dtr is giving pt roxanol prn for s&s of distress. pt appears calm with s&s controlled. Pt takes her meds as liquid only. Sn reviewed pts meds. Refilled meds: none needed Ordered supplies: none needed. SN updated mar & reconciled meds. sn updated care plan. Instructed pts cg to call [facility] with any questions or concerns. Pts cg verbalized understanding and agreement. sn will see pt again tomorrow.

## 2021-10-04 NOTE — HOSPICE
Daily nurse visits due to pt decline and family support. pt is lying in hospital bed unresponsive. Pt is bedbound. Pts dtr is present. Pt has a sacral wound that was cleansed & redressed today by rn. pt tolerated procedure well. Pt has no Sob today. Pt wears o2 at 2 liters prn. Pt has no edema. Pts Appetite is none. Pts cg denies any pt Nausea / vomiting. Pts Urine is medium yellow and clear in catheter bag. Pt has no s&s of pain today. pts dtr is giving pt roxanol prn for s&s of distress. pt appears calm with s&s controlled. Pt takes her meds as liquid only. Sn reviewed pts meds. Refilled meds: none needed   Ordered supplies: none needed. SN updated mar & reconciled meds. sn updated care plan. Instructed pts cg to call [facility] with any questions or concerns. Pts cg verbalized understanding and agreement. sn will see pt again tomorrow.

## 2021-10-04 NOTE — HOSPICE
Patient visit for transitioning-arrived in the middle of CG doing sacral wound dressing change and assisted with completion  Linn draining 600-700cc in 12 hours although no PO intake but mouth care  Patient nonverbal and lethargic but nods responses at times  FLACC score 1 roxanol given at 5mg   Lungs clear and diminished bases on RA  Repositioned patient with heels floated and pillows  Vitals changing as patient transitions  Educated CG on dying process as she felt patient was going to improve  CG verbalized understanding of patient decline

## 2021-10-06 PROBLEM — Z96.1 BILATERAL PSEUDOPHAKIA: Status: ACTIVE | Noted: 2018-11-21

## 2021-10-06 NOTE — HOSPICE
pts family called and stated that pt was not breathing. sn arrived. pt was absent of vital signs. pt was verified that pt had  at  1305 pm. Cory Coulter was notified of pts death. pts family was present and coping well. Family did not want  home called for several hours due to family coming in from out of town. sn called [facility] to have equipment picked up. Sn bathed pt. Family refused the need of a . family did request a .  joe arrived to assist family with grief. Email sent to [facility] staff about pts death. [facility]  was faxed notification of pts death. Instructed family to call [facility] with any questions. sn removed pts morton catheter. Family finally called at 7 pm to have body removed from home. [facility] [place]